# Patient Record
Sex: FEMALE | URBAN - METROPOLITAN AREA
[De-identification: names, ages, dates, MRNs, and addresses within clinical notes are randomized per-mention and may not be internally consistent; named-entity substitution may affect disease eponyms.]

---

## 2017-03-01 ENCOUNTER — OUTPATIENT (OUTPATIENT)
Dept: OUTPATIENT SERVICES | Facility: HOSPITAL | Age: 67
LOS: 1 days | End: 2017-03-01
Payer: MEDICAID

## 2017-03-14 DIAGNOSIS — R69 ILLNESS, UNSPECIFIED: ICD-10-CM

## 2017-09-01 PROCEDURE — G9001: CPT

## 2025-03-05 ENCOUNTER — INPATIENT (INPATIENT)
Facility: HOSPITAL | Age: 75
LOS: 1 days | Discharge: HOME | DRG: 282 | End: 2025-03-07
Attending: STUDENT IN AN ORGANIZED HEALTH CARE EDUCATION/TRAINING PROGRAM | Admitting: STUDENT IN AN ORGANIZED HEALTH CARE EDUCATION/TRAINING PROGRAM
Payer: COMMERCIAL

## 2025-03-05 VITALS
SYSTOLIC BLOOD PRESSURE: 155 MMHG | WEIGHT: 139.99 LBS | HEART RATE: 83 BPM | OXYGEN SATURATION: 97 % | DIASTOLIC BLOOD PRESSURE: 82 MMHG | HEIGHT: 55 IN | TEMPERATURE: 98 F | RESPIRATION RATE: 18 BRPM

## 2025-03-05 LAB
ALBUMIN SERPL ELPH-MCNC: 3.8 G/DL — SIGNIFICANT CHANGE UP (ref 3.3–5)
ALP SERPL-CCNC: 85 U/L — SIGNIFICANT CHANGE UP (ref 40–120)
ALT FLD-CCNC: 16 U/L — SIGNIFICANT CHANGE UP (ref 10–45)
ANION GAP SERPL CALC-SCNC: 13 MMOL/L — SIGNIFICANT CHANGE UP (ref 5–17)
APTT BLD: 27.4 SEC — SIGNIFICANT CHANGE UP (ref 24.5–35.6)
AST SERPL-CCNC: 18 U/L — SIGNIFICANT CHANGE UP (ref 10–40)
BASOPHILS # BLD AUTO: 0.02 K/UL — SIGNIFICANT CHANGE UP (ref 0–0.2)
BASOPHILS NFR BLD AUTO: 0.4 % — SIGNIFICANT CHANGE UP (ref 0–2)
BILIRUB SERPL-MCNC: 0.4 MG/DL — SIGNIFICANT CHANGE UP (ref 0.2–1.2)
BLD GP AB SCN SERPL QL: NEGATIVE — SIGNIFICANT CHANGE UP
BUN SERPL-MCNC: 9 MG/DL — SIGNIFICANT CHANGE UP (ref 7–23)
CALCIUM SERPL-MCNC: 8.9 MG/DL — SIGNIFICANT CHANGE UP (ref 8.4–10.5)
CHLORIDE SERPL-SCNC: 104 MMOL/L — SIGNIFICANT CHANGE UP (ref 96–108)
CK MB CFR SERPL CALC: 4.3 NG/ML — HIGH (ref 0–3.8)
CO2 SERPL-SCNC: 23 MMOL/L — SIGNIFICANT CHANGE UP (ref 22–31)
CREAT SERPL-MCNC: 0.61 MG/DL — SIGNIFICANT CHANGE UP (ref 0.5–1.3)
EGFR: 94 ML/MIN/1.73M2 — SIGNIFICANT CHANGE UP
EGFR: 94 ML/MIN/1.73M2 — SIGNIFICANT CHANGE UP
EOSINOPHIL # BLD AUTO: 0.06 K/UL — SIGNIFICANT CHANGE UP (ref 0–0.5)
EOSINOPHIL NFR BLD AUTO: 1.1 % — SIGNIFICANT CHANGE UP (ref 0–6)
GLUCOSE SERPL-MCNC: 110 MG/DL — HIGH (ref 70–99)
HCT VFR BLD CALC: 35.9 % — SIGNIFICANT CHANGE UP (ref 34.5–45)
HGB BLD-MCNC: 12.6 G/DL — SIGNIFICANT CHANGE UP (ref 11.5–15.5)
IMM GRANULOCYTES NFR BLD AUTO: 0.2 % — SIGNIFICANT CHANGE UP (ref 0–0.9)
INR BLD: 1.01 RATIO — SIGNIFICANT CHANGE UP (ref 0.85–1.16)
LYMPHOCYTES # BLD AUTO: 2.02 K/UL — SIGNIFICANT CHANGE UP (ref 1–3.3)
LYMPHOCYTES # BLD AUTO: 36.5 % — SIGNIFICANT CHANGE UP (ref 13–44)
MCHC RBC-ENTMCNC: 31 PG — SIGNIFICANT CHANGE UP (ref 27–34)
MCHC RBC-ENTMCNC: 35.1 G/DL — SIGNIFICANT CHANGE UP (ref 32–36)
MCV RBC AUTO: 88.2 FL — SIGNIFICANT CHANGE UP (ref 80–100)
MONOCYTES # BLD AUTO: 0.4 K/UL — SIGNIFICANT CHANGE UP (ref 0–0.9)
MONOCYTES NFR BLD AUTO: 7.2 % — SIGNIFICANT CHANGE UP (ref 2–14)
NEUTROPHILS # BLD AUTO: 3.03 K/UL — SIGNIFICANT CHANGE UP (ref 1.8–7.4)
NEUTROPHILS NFR BLD AUTO: 54.6 % — SIGNIFICANT CHANGE UP (ref 43–77)
NRBC BLD AUTO-RTO: 0 /100 WBCS — SIGNIFICANT CHANGE UP (ref 0–0)
PLATELET # BLD AUTO: 203 K/UL — SIGNIFICANT CHANGE UP (ref 150–400)
POTASSIUM SERPL-MCNC: 3.4 MMOL/L — LOW (ref 3.5–5.3)
POTASSIUM SERPL-SCNC: 3.4 MMOL/L — LOW (ref 3.5–5.3)
PROT SERPL-MCNC: 6.7 G/DL — SIGNIFICANT CHANGE UP (ref 6–8.3)
PROTHROM AB SERPL-ACNC: 11.6 SEC — SIGNIFICANT CHANGE UP (ref 9.9–13.4)
RBC # BLD: 4.07 M/UL — SIGNIFICANT CHANGE UP (ref 3.8–5.2)
RBC # FLD: 14.6 % — HIGH (ref 10.3–14.5)
RH IG SCN BLD-IMP: POSITIVE — SIGNIFICANT CHANGE UP
SODIUM SERPL-SCNC: 140 MMOL/L — SIGNIFICANT CHANGE UP (ref 135–145)
TROPONIN T, HIGH SENSITIVITY RESULT: 294 NG/L — HIGH (ref 0–51)
WBC # BLD: 5.54 K/UL — SIGNIFICANT CHANGE UP (ref 3.8–10.5)
WBC # FLD AUTO: 5.54 K/UL — SIGNIFICANT CHANGE UP (ref 3.8–10.5)

## 2025-03-05 PROCEDURE — 99291 CRITICAL CARE FIRST HOUR: CPT

## 2025-03-05 PROCEDURE — 71045 X-RAY EXAM CHEST 1 VIEW: CPT | Mod: 26

## 2025-03-05 RX ORDER — SODIUM CHLORIDE 9 G/1000ML
1000 INJECTION, SOLUTION INTRAVENOUS
Refills: 0 | Status: DISCONTINUED | OUTPATIENT
Start: 2025-03-05 | End: 2025-03-06

## 2025-03-05 RX ORDER — DEXTROSE 50 % IN WATER 50 %
15 SYRINGE (ML) INTRAVENOUS ONCE
Refills: 0 | Status: DISCONTINUED | OUTPATIENT
Start: 2025-03-05 | End: 2025-03-06

## 2025-03-05 RX ORDER — HEPARIN SODIUM 1000 [USP'U]/ML
3800 INJECTION INTRAVENOUS; SUBCUTANEOUS ONCE
Refills: 0 | Status: COMPLETED | OUTPATIENT
Start: 2025-03-05 | End: 2025-03-05

## 2025-03-05 RX ORDER — HEPARIN SODIUM 1000 [USP'U]/ML
3800 INJECTION INTRAVENOUS; SUBCUTANEOUS EVERY 6 HOURS
Refills: 0 | Status: DISCONTINUED | OUTPATIENT
Start: 2025-03-05 | End: 2025-03-06

## 2025-03-05 RX ORDER — DEXTROSE 50 % IN WATER 50 %
12.5 SYRINGE (ML) INTRAVENOUS ONCE
Refills: 0 | Status: DISCONTINUED | OUTPATIENT
Start: 2025-03-05 | End: 2025-03-06

## 2025-03-05 RX ORDER — DEXTROSE 50 % IN WATER 50 %
25 SYRINGE (ML) INTRAVENOUS ONCE
Refills: 0 | Status: DISCONTINUED | OUTPATIENT
Start: 2025-03-05 | End: 2025-03-06

## 2025-03-05 RX ORDER — HEPARIN SODIUM 1000 [USP'U]/ML
INJECTION INTRAVENOUS; SUBCUTANEOUS
Qty: 25000 | Refills: 0 | Status: DISCONTINUED | OUTPATIENT
Start: 2025-03-05 | End: 2025-03-06

## 2025-03-05 RX ORDER — INSULIN LISPRO 100 U/ML
INJECTION, SOLUTION INTRAVENOUS; SUBCUTANEOUS AT BEDTIME
Refills: 0 | Status: DISCONTINUED | OUTPATIENT
Start: 2025-03-05 | End: 2025-03-06

## 2025-03-05 RX ORDER — GLUCAGON 3 MG/1
1 POWDER NASAL ONCE
Refills: 0 | Status: DISCONTINUED | OUTPATIENT
Start: 2025-03-05 | End: 2025-03-06

## 2025-03-05 RX ORDER — INSULIN LISPRO 100 U/ML
INJECTION, SOLUTION INTRAVENOUS; SUBCUTANEOUS
Refills: 0 | Status: DISCONTINUED | OUTPATIENT
Start: 2025-03-05 | End: 2025-03-06

## 2025-03-05 RX ADMIN — HEPARIN SODIUM 750 UNIT(S)/HR: 1000 INJECTION INTRAVENOUS; SUBCUTANEOUS at 23:57

## 2025-03-05 RX ADMIN — HEPARIN SODIUM 3800 UNIT(S): 1000 INJECTION INTRAVENOUS; SUBCUTANEOUS at 23:55

## 2025-03-05 NOTE — ED ADULT NURSE NOTE - OBJECTIVE STATEMENT
74 y/o F with PMHx of HTN, HLD, DM, HIV presents to the ED brought in by EMS as a transfer from [facility] for NSTEMI/chest pain. Per patient developed chest pain 13 days ago, however has since worsened over the last day. Pain described as epigastric/burning pain. Per EMS, patient initial troponin noted to be 254 followed by 271. Patient given aspirin 324 and 40 meq of K+ at transfer facility. Patient notes pain is improved, notes pain 7/10 in severity at present. NSR on cardiac monitor. AOx2 and speaking coherently, disoriented to time/situation. Breathing is unlabored, spontaneous, and symmetrical. Abdomen is soft, nondistended, and nontender. No bladder distention. No peripheral edema noted. <2s capillary refill. Ambulatory with full ROM of all extremities.

## 2025-03-05 NOTE — ED PROVIDER NOTE - ATTENDING CONTRIBUTION TO CARE
Attending MD Griffin:  I have seen and examined this patient and fully participated in the care of this patient as the teaching attending. I personally made/approved the management plan and take responsibility for the patient management.      75-year-old woman with history of HIV is transferred from outside hospital for reported NSTEMI.  Patient reports that she developed chest pain during an argument with her outpatient doctor yesterday or last night.  At this time she is not experiencing chest pain but feels "moving" in her chest.  Reported troponin was 254 and then up trended to 271.  Patient received aspirin 324 and potassium but no other interventions.  Patient states that she has a history of maybe some cardiac issues but she does not know the specifics of this and states that she was taking aspirin previously.    Patient's vital signs are notable for blood pressure 155 systolic otherwise nonactionable.  Patient sitting up in the stretcher in no apparent distress.  She is breathing comfortably on room air.  Extremities warm and well-perfused.    ECG recorded at 9:21 PM independently interpreted by me , Dr Mauricio Griffin,  at 9:45 PM shows normal sinus rhythm normal axis normal intervals T wave inversions lead III lead aVF no ST elevation or ST depression.    Patient presenting for reported chest pain as outpatient with elevated cardiac enzymes, patient seen by cardiology service on arrival to ED, they recommend repeating cardiac enzymes and further recommendations will be as per repeat lab work      *The above represents an initial assessment/impression. Please refer to progress notes for potential changes in patient clinical course*

## 2025-03-05 NOTE — ED PROVIDER NOTE - CLINICAL SUMMARY MEDICAL DECISION MAKING FREE TEXT BOX
Pt is a 76 y/o F with PMHx of HIV, HTN, Asthma, T2DM (on insulin), HLD presenting to the ER as an NSTEMI evaluation from [facility]. Pt has been having epigastric pain x13D, went to see her  @1PM for HIV f/u at which time she was arguing with the physician and began experiencing exertional chest pain for which she was originally sent to [facility]. There, Troponin uptrending 254 -> 271 (most recently @6PM) concerning for NSTEMI and was given 324 ASA @6PM and potassium repletion. ASA resolved her pain, currently reports "movement in chest" though no pain, nausea, vomiting. Of note patient stated 8 years ago was started on aspirin for "heart problems", no cardiac stent history. Cardiology consulted here.    VS grossly normal aside from HTN, PE unremarkable  Labs and Imaging pending, repeat troponin pending  EKG with T-wave inversions in inferior leads III, AVF though no ST changes    - Repeat labs, troponin, CXR  - Cardiology onboard

## 2025-03-06 DIAGNOSIS — I21.4 NON-ST ELEVATION (NSTEMI) MYOCARDIAL INFARCTION: ICD-10-CM

## 2025-03-06 DIAGNOSIS — E11.9 TYPE 2 DIABETES MELLITUS WITHOUT COMPLICATIONS: ICD-10-CM

## 2025-03-06 DIAGNOSIS — B20 HUMAN IMMUNODEFICIENCY VIRUS [HIV] DISEASE: ICD-10-CM

## 2025-03-06 DIAGNOSIS — E04.9 NONTOXIC GOITER, UNSPECIFIED: ICD-10-CM

## 2025-03-06 DIAGNOSIS — I10 ESSENTIAL (PRIMARY) HYPERTENSION: ICD-10-CM

## 2025-03-06 DIAGNOSIS — E78.5 HYPERLIPIDEMIA, UNSPECIFIED: ICD-10-CM

## 2025-03-06 LAB
A1C WITH ESTIMATED AVERAGE GLUCOSE RESULT: 9.5 % — HIGH (ref 4–5.6)
APTT BLD: 60 SEC — HIGH (ref 24.5–35.6)
CHOLEST SERPL-MCNC: 189 MG/DL — SIGNIFICANT CHANGE UP
CK MB BLD-MCNC: 3.8 % — HIGH (ref 0–3.5)
CK MB CFR SERPL CALC: 3.7 NG/ML — SIGNIFICANT CHANGE UP (ref 0–3.8)
CK SERPL-CCNC: 97 U/L — SIGNIFICANT CHANGE UP (ref 25–170)
ESTIMATED AVERAGE GLUCOSE: 226 MG/DL — HIGH (ref 68–114)
GLUCOSE BLDC GLUCOMTR-MCNC: 122 MG/DL — HIGH (ref 70–99)
GLUCOSE BLDC GLUCOMTR-MCNC: 172 MG/DL — HIGH (ref 70–99)
GLUCOSE BLDC GLUCOMTR-MCNC: 173 MG/DL — HIGH (ref 70–99)
HCT VFR BLD CALC: 35.6 % — SIGNIFICANT CHANGE UP (ref 34.5–45)
HDLC SERPL-MCNC: 48 MG/DL — LOW
HGB BLD-MCNC: 12.4 G/DL — SIGNIFICANT CHANGE UP (ref 11.5–15.5)
LIPID PNL WITH DIRECT LDL SERPL: 108 MG/DL — HIGH
MCHC RBC-ENTMCNC: 31.3 PG — SIGNIFICANT CHANGE UP (ref 27–34)
MCHC RBC-ENTMCNC: 34.8 G/DL — SIGNIFICANT CHANGE UP (ref 32–36)
MCV RBC AUTO: 89.9 FL — SIGNIFICANT CHANGE UP (ref 80–100)
NON HDL CHOLESTEROL: 141 MG/DL — HIGH
NRBC BLD AUTO-RTO: 0 /100 WBCS — SIGNIFICANT CHANGE UP (ref 0–0)
PLATELET # BLD AUTO: 204 K/UL — SIGNIFICANT CHANGE UP (ref 150–400)
RBC # BLD: 3.96 M/UL — SIGNIFICANT CHANGE UP (ref 3.8–5.2)
RBC # FLD: 14.8 % — HIGH (ref 10.3–14.5)
TRIGL SERPL-MCNC: 190 MG/DL — HIGH
TROPONIN T, HIGH SENSITIVITY RESULT: 321 NG/L — HIGH (ref 0–51)
WBC # BLD: 4.99 K/UL — SIGNIFICANT CHANGE UP (ref 3.8–10.5)
WBC # FLD AUTO: 4.99 K/UL — SIGNIFICANT CHANGE UP (ref 3.8–10.5)

## 2025-03-06 PROCEDURE — 92978 ENDOLUMINL IVUS OCT C 1ST: CPT | Mod: 26,RC

## 2025-03-06 PROCEDURE — 99223 1ST HOSP IP/OBS HIGH 75: CPT

## 2025-03-06 PROCEDURE — 93458 L HRT ARTERY/VENTRICLE ANGIO: CPT | Mod: 26,59

## 2025-03-06 PROCEDURE — 93010 ELECTROCARDIOGRAM REPORT: CPT

## 2025-03-06 PROCEDURE — 92928 PRQ TCAT PLMT NTRAC ST 1 LES: CPT | Mod: RC

## 2025-03-06 PROCEDURE — 99152 MOD SED SAME PHYS/QHP 5/>YRS: CPT

## 2025-03-06 RX ORDER — CLOPIDOGREL BISULFATE 75 MG/1
75 TABLET, FILM COATED ORAL DAILY
Refills: 0 | Status: DISCONTINUED | OUTPATIENT
Start: 2025-03-07 | End: 2025-03-07

## 2025-03-06 RX ORDER — MELATONIN 5 MG
3 TABLET ORAL AT BEDTIME
Refills: 0 | Status: DISCONTINUED | OUTPATIENT
Start: 2025-03-06 | End: 2025-03-07

## 2025-03-06 RX ORDER — OMEPRAZOLE 20 MG/1
1 CAPSULE, DELAYED RELEASE ORAL
Refills: 0 | DISCHARGE

## 2025-03-06 RX ORDER — SODIUM CHLORIDE 9 G/1000ML
1000 INJECTION, SOLUTION INTRAVENOUS
Refills: 0 | Status: DISCONTINUED | OUTPATIENT
Start: 2025-03-06 | End: 2025-03-07

## 2025-03-06 RX ORDER — GLUCAGON 3 MG/1
1 POWDER NASAL ONCE
Refills: 0 | Status: DISCONTINUED | OUTPATIENT
Start: 2025-03-06 | End: 2025-03-07

## 2025-03-06 RX ORDER — DEXTROSE 50 % IN WATER 50 %
25 SYRINGE (ML) INTRAVENOUS ONCE
Refills: 0 | Status: DISCONTINUED | OUTPATIENT
Start: 2025-03-06 | End: 2025-03-07

## 2025-03-06 RX ORDER — ASPIRIN 325 MG
81 TABLET ORAL DAILY
Refills: 0 | Status: DISCONTINUED | OUTPATIENT
Start: 2025-03-06 | End: 2025-03-07

## 2025-03-06 RX ORDER — DEXTROSE 50 % IN WATER 50 %
15 SYRINGE (ML) INTRAVENOUS ONCE
Refills: 0 | Status: DISCONTINUED | OUTPATIENT
Start: 2025-03-06 | End: 2025-03-07

## 2025-03-06 RX ORDER — HYDROCHLOROTHIAZIDE 50 MG/1
1 TABLET ORAL
Refills: 0 | DISCHARGE

## 2025-03-06 RX ORDER — METOPROLOL SUCCINATE 50 MG/1
1 TABLET, EXTENDED RELEASE ORAL
Refills: 0 | DISCHARGE

## 2025-03-06 RX ORDER — ATORVASTATIN CALCIUM 80 MG/1
40 TABLET, FILM COATED ORAL AT BEDTIME
Refills: 0 | Status: DISCONTINUED | OUTPATIENT
Start: 2025-03-06 | End: 2025-03-07

## 2025-03-06 RX ORDER — METOPROLOL SUCCINATE 50 MG/1
50 TABLET, EXTENDED RELEASE ORAL
Refills: 0 | Status: DISCONTINUED | OUTPATIENT
Start: 2025-03-06 | End: 2025-03-07

## 2025-03-06 RX ORDER — AMLODIPINE BESYLATE 10 MG/1
1 TABLET ORAL
Refills: 0 | DISCHARGE

## 2025-03-06 RX ORDER — LATANOPROST PF 0.05 MG/ML
1 SOLUTION/ DROPS OPHTHALMIC
Refills: 0 | DISCHARGE

## 2025-03-06 RX ORDER — INSULIN LISPRO 100 U/ML
INJECTION, SOLUTION INTRAVENOUS; SUBCUTANEOUS AT BEDTIME
Refills: 0 | Status: DISCONTINUED | OUTPATIENT
Start: 2025-03-06 | End: 2025-03-07

## 2025-03-06 RX ORDER — ACETAMINOPHEN 500 MG/5ML
650 LIQUID (ML) ORAL EVERY 6 HOURS
Refills: 0 | Status: DISCONTINUED | OUTPATIENT
Start: 2025-03-06 | End: 2025-03-07

## 2025-03-06 RX ORDER — INSULIN GLARGINE-YFGN 100 [IU]/ML
15 INJECTION, SOLUTION SUBCUTANEOUS AT BEDTIME
Refills: 0 | Status: DISCONTINUED | OUTPATIENT
Start: 2025-03-06 | End: 2025-03-07

## 2025-03-06 RX ORDER — BICTEGRAVIR SODIUM, EMTRICITABINE, AND TENOFOVIR ALAFENAMIDE FUMARATE 30; 120; 15 MG/1; MG/1; MG/1
1 TABLET ORAL DAILY
Refills: 0 | Status: DISCONTINUED | OUTPATIENT
Start: 2025-03-06 | End: 2025-03-07

## 2025-03-06 RX ORDER — METFORMIN HYDROCHLORIDE 850 MG/1
1 TABLET ORAL
Refills: 0 | DISCHARGE

## 2025-03-06 RX ORDER — INSULIN LISPRO 100 U/ML
INJECTION, SOLUTION INTRAVENOUS; SUBCUTANEOUS
Refills: 0 | Status: DISCONTINUED | OUTPATIENT
Start: 2025-03-06 | End: 2025-03-07

## 2025-03-06 RX ORDER — MAGNESIUM, ALUMINUM HYDROXIDE 200-200 MG
30 TABLET,CHEWABLE ORAL EVERY 4 HOURS
Refills: 0 | Status: DISCONTINUED | OUTPATIENT
Start: 2025-03-06 | End: 2025-03-07

## 2025-03-06 RX ORDER — LATANOPROST PF 0.05 MG/ML
1 SOLUTION/ DROPS OPHTHALMIC AT BEDTIME
Refills: 0 | Status: DISCONTINUED | OUTPATIENT
Start: 2025-03-06 | End: 2025-03-07

## 2025-03-06 RX ORDER — BICTEGRAVIR SODIUM, EMTRICITABINE, AND TENOFOVIR ALAFENAMIDE FUMARATE 30; 120; 15 MG/1; MG/1; MG/1
1 TABLET ORAL
Refills: 0 | DISCHARGE

## 2025-03-06 RX ORDER — ATORVASTATIN CALCIUM 80 MG/1
10 TABLET, FILM COATED ORAL AT BEDTIME
Refills: 0 | Status: DISCONTINUED | OUTPATIENT
Start: 2025-03-06 | End: 2025-03-06

## 2025-03-06 RX ORDER — AMLODIPINE BESYLATE 10 MG/1
10 TABLET ORAL DAILY
Refills: 0 | Status: DISCONTINUED | OUTPATIENT
Start: 2025-03-06 | End: 2025-03-07

## 2025-03-06 RX ORDER — DEXTROSE 50 % IN WATER 50 %
12.5 SYRINGE (ML) INTRAVENOUS ONCE
Refills: 0 | Status: DISCONTINUED | OUTPATIENT
Start: 2025-03-06 | End: 2025-03-07

## 2025-03-06 RX ADMIN — METOPROLOL SUCCINATE 50 MILLIGRAM(S): 50 TABLET, EXTENDED RELEASE ORAL at 22:42

## 2025-03-06 RX ADMIN — LATANOPROST PF 1 DROP(S): 0.05 SOLUTION/ DROPS OPHTHALMIC at 22:41

## 2025-03-06 RX ADMIN — Medication 100 MILLILITER(S): at 17:01

## 2025-03-06 RX ADMIN — BICTEGRAVIR SODIUM, EMTRICITABINE, AND TENOFOVIR ALAFENAMIDE FUMARATE 1 TABLET(S): 30; 120; 15 TABLET ORAL at 22:41

## 2025-03-06 RX ADMIN — ATORVASTATIN CALCIUM 40 MILLIGRAM(S): 80 TABLET, FILM COATED ORAL at 22:42

## 2025-03-06 RX ADMIN — INSULIN LISPRO 1: 100 INJECTION, SOLUTION INTRAVENOUS; SUBCUTANEOUS at 09:23

## 2025-03-06 RX ADMIN — HEPARIN SODIUM 750 UNIT(S)/HR: 1000 INJECTION INTRAVENOUS; SUBCUTANEOUS at 08:30

## 2025-03-06 RX ADMIN — INSULIN GLARGINE-YFGN 15 UNIT(S): 100 INJECTION, SOLUTION SUBCUTANEOUS at 22:41

## 2025-03-06 NOTE — DISCHARGE NOTE PROVIDER - NSDCCPCAREPLAN_GEN_ALL_CORE_FT
PRINCIPAL DISCHARGE DIAGNOSIS  Diagnosis: NSTEMI (non-ST elevation myocardial infarction)  Assessment and Plan of Treatment: Call your doctor if you have unusual chest pain, pressure, or discomfort, shortness of breath, nausea, vomiting, burping, heartburn, tingling upper body parts, sweating, cold, clammy sking, racing heartbeat  Call 911 if you think you are having a heart attack  Take all cardiac medications as prescribed - notify your doctor if you have any side effects  Follow cardiac diet - avoid fatty & fried foods, don't eat too much red meat, eat lots of fruits & vegetables, dairy products should be low fat  Lose weight if you are overweight  Become more active with walking, gardening, or any other activity that gets you to move        SECONDARY DISCHARGE DIAGNOSES  Diagnosis: DM (diabetes mellitus)  Assessment and Plan of Treatment: HgA1C this admission.  Make sure you get your HgA1c checked every three months.  If you take oral diabetes medications, check your blood glucose two times a day.  If you take insulin, check your blood glucose before meals and at bedtime.  It's important not to skip any meals.  Keep a log of your blood glucose results and always take it with you to your doctor appointments.  Keep a list of your current medications including injectables and over the counter medications and bring this medication list with you to all your doctor appointments.  If you have not seen your ophthalmologist this year call for appointment.  Check your feet daily for redness, sores, or openings. Do not self treat. If no improvement in two days call your primary care physician for an appointment.  Low blood sugar (hypoglycemia) is a blood sugar below 70mg/dl. Check your blood sugar if you feel signs/symptoms of hypoglycemia. If your blood sugar is below 70 take 15 grams of carbohydrates (ex 4 oz of apple juice, 3-4 glucose tablets, or 4-6 oz of regular soda) wait 15 minutes and repeat blood sugar to make sure it comes up above 70.  If your blood sugar is above 70 and you are due for a meal, have a meal.  If you are not due for a meal have a snack.  This snack helps keeps your blood sugar at a safe range.      Diagnosis: HTN (hypertension)  Assessment and Plan of Treatment: Follow up with your medical doctor to establish long term blood pressure treatment goals.

## 2025-03-06 NOTE — H&P ADULT - PROBLEM SELECTOR PLAN 4
Pt on multiple oral/SQ meds and insulin at home (repaglinide, actos, metformin, januvia, jardiance, ozempic, basaglar)  Will check her A1c - ? unclear she needs so many meds.   Will monitor on SS and cont with long acting insulin - will dec to 15 units from 20 units for now.   Hold rest of DM meds

## 2025-03-06 NOTE — CONSULT NOTE ADULT - ATTENDING COMMENTS
CARDIOLOGY ATTENDING CONSULT NOTE     HPI    Please refer to summary under A/P section    ROS  Relevant ROS above in HPI    PMH/PSH  As above    FH/SH  Denies smoking, illicits or alcohol  Reports she does not know anything about her family as she does not speak with them    PE  Vitals  Gen: NAD, breathing comfortably  HEENT: MMM, anicteric sclera  Neck: JVP < 10 cm  Cardiac: RRR, 1/6 systolic murmur  Lungs: Adequate inspiratory effort. CTAB  Abd: soft, NT/ND. +BS  Ext: warm and well perfused. No significant LE edema    Data:  I have personally reviewed all data:  Labs reviewed     CXR  FINDINGS:  The heart is normal in size.  Nonspecific superior mediastinal opacity.  There is no pneumothorax or pleural effusion.  No acute bony abnormality.  IMPRESSION:  Nonspecific superior mediastinal opacity may represent enlarged thyroid.    EKG:  sinus rhythm. T wave inversions consider inferior ischemia    Echo:    Telemetry  sinus rhythm, intermittent sinus tachycardia    MEDICATIONS  (STANDING):  amLODIPine   Tablet 10 milliGRAM(s) Oral daily  aspirin  chewable 81 milliGRAM(s) Oral daily  atorvastatin 10 milliGRAM(s) Oral at bedtime  bictegravir 50 mG/emtricitabine 200 mG/tenofovir alafenamide 25 mG (BIKTARVY) 1 Tablet(s) Oral daily  dextrose 5%. 1000 milliLiter(s) (100 mL/Hr) IV Continuous <Continuous>  dextrose 5%. 1000 milliLiter(s) (50 mL/Hr) IV Continuous <Continuous>  dextrose 50% Injectable 25 Gram(s) IV Push once  dextrose 50% Injectable 12.5 Gram(s) IV Push once  dextrose 50% Injectable 25 Gram(s) IV Push once  glucagon  Injectable 1 milliGRAM(s) IntraMuscular once  heparin  Infusion.  Unit(s)/Hr (7.5 mL/Hr) IV Continuous <Continuous>  insulin glargine Injectable (LANTUS) 15 Unit(s) SubCutaneous at bedtime  insulin lispro (ADMELOG) corrective regimen sliding scale   SubCutaneous three times a day before meals  insulin lispro (ADMELOG) corrective regimen sliding scale   SubCutaneous at bedtime  latanoprost 0.005% Ophthalmic Solution 1 Drop(s) Both EYES at bedtime  metoprolol tartrate 50 milliGRAM(s) Oral two times a day  pantoprazole    Tablet 40 milliGRAM(s) Oral before breakfast        A/P    DOMENIC AGARWAL is a 75y Female with a history of HIV on HAART, DM, HTN who presented to an outside hospital with chest pain and was found to have an elevated troponin. Due to concern for NSTEMI she was transferred to [facility] for further management.     #NSTE-ACS/NSTEMI  #HTN  #DM    Left heart catheterization today. Continue ASA. P2Y12 inhibitor can be loaded at time of angiogram if PCI needed. Continue heparin gtt and metoprolol.     Please check TSH (especially given CXR findings), A1c, Lipids    We will continue to follow with you. Thank you for allowing us to participate in this patient's care.    Kg Almanzar MD, [facility]  For [facility] Cardiology and Cardiovascular Surgery 24/7 service contact information, please go to [email or web address] ("[email or web address]" to login) CARDIOLOGY ATTENDING CONSULT NOTE     HPI    Please refer to summary under A/P section    ROS  Relevant ROS above in HPI    PMH/PSH  As above    FH/SH  Denies smoking, illicits or alcohol  Reports she does not know anything about her family as she does not speak with them    PE  Vitals  Gen: NAD, breathing comfortably  HEENT: MMM, anicteric sclera  Neck: JVP < 10 cm  Cardiac: RRR, 1/6 systolic murmur  Lungs: Adequate inspiratory effort. CTAB  Abd: soft, NT/ND. +BS  Ext: warm and well perfused. No significant LE edema    Data:  I have personally reviewed all data:  Labs reviewed     CXR  FINDINGS:  The heart is normal in size.  Nonspecific superior mediastinal opacity.  There is no pneumothorax or pleural effusion.  No acute bony abnormality.  IMPRESSION:  Nonspecific superior mediastinal opacity may represent enlarged thyroid.    EKG:  sinus rhythm. T wave inversions consider inferior ischemia    Echo:    Telemetry  sinus rhythm, intermittent sinus tachycardia    MEDICATIONS  (STANDING):  amLODIPine   Tablet 10 milliGRAM(s) Oral daily  aspirin  chewable 81 milliGRAM(s) Oral daily  atorvastatin 10 milliGRAM(s) Oral at bedtime  bictegravir 50 mG/emtricitabine 200 mG/tenofovir alafenamide 25 mG (BIKTARVY) 1 Tablet(s) Oral daily  dextrose 5%. 1000 milliLiter(s) (100 mL/Hr) IV Continuous <Continuous>  dextrose 5%. 1000 milliLiter(s) (50 mL/Hr) IV Continuous <Continuous>  dextrose 50% Injectable 25 Gram(s) IV Push once  dextrose 50% Injectable 12.5 Gram(s) IV Push once  dextrose 50% Injectable 25 Gram(s) IV Push once  glucagon  Injectable 1 milliGRAM(s) IntraMuscular once  heparin  Infusion.  Unit(s)/Hr (7.5 mL/Hr) IV Continuous <Continuous>  insulin glargine Injectable (LANTUS) 15 Unit(s) SubCutaneous at bedtime  insulin lispro (ADMELOG) corrective regimen sliding scale   SubCutaneous three times a day before meals  insulin lispro (ADMELOG) corrective regimen sliding scale   SubCutaneous at bedtime  latanoprost 0.005% Ophthalmic Solution 1 Drop(s) Both EYES at bedtime  metoprolol tartrate 50 milliGRAM(s) Oral two times a day  pantoprazole    Tablet 40 milliGRAM(s) Oral before breakfast        A/P    DOMENIC AGARWAL is a 75y Female with a history of HIV on HAART, DM, HTN who presented to an outside hospital with chest pain and was found to have an elevated troponin. Due to concern for NSTEMI she was transferred to [facility] for further management.     #NSTE-ACS/NSTEMI  #HTN  #DM    Left heart catheterization today. Continue ASA. P2Y12 inhibitor can be loaded at time of angiogram if PCI needed. Continue heparin gtt and metoprolol. Increase atorvastatin to high intensity dosing, 40-80 mg (no interaction with Biktarvy) if no history of statin intolerance    Please check TSH (especially given CXR findings), A1c, Lipids    We will continue to follow with you. Thank you for allowing us to participate in this patient's care.    Kg Almanzar MD, [facility]  For [facility] Cardiology and Cardiovascular Surgery 24/7 service contact information, please go to [email or web address] ("[email or web address]" to login)

## 2025-03-06 NOTE — DISCHARGE NOTE PROVIDER - HOSPITAL COURSE
HPI:  74 yo f with h/o HTN, DM2, HLD, HIV, asthma presented after an argument with her outpt HIV provider developed chest pain . Patient was seen at OSH and transferred to [facility] for further card eval. Pt reports she has been having substernal chest pain - pressure like, nonradiating for about 2 weeks. slight worse with exertion when going up stairs. some MURDOCK. denies any positional or orthopnea or leg swelling. Pain suddenly got worse yesterday while at outpt provider office during conversation. unable to tell me how long symptoms lasted. this am still has some chest discomfort but contributes to "gas." Per pt no previous known h/o heart dz or card w/u such as echo, stress, angio     Overnight most recent VS afebrile, 123/70, 92, 18, 97% RA . at time seen visible discomfort due to reflux like symptoms intermittently. denies any nausea or vomiting.  (06 Mar 2025 09:43)    Hospital Course:      Important Medication Changes and Reason:    Active or Pending Issues Requiring Follow-up:    Advanced Directives:   [ X] Full code  [ ] DNR  [ ] Hospice    Discharge Diagnoses:  NSTEMI       HPI:  76 yo f with h/o HTN, DM2, HLD, HIV, asthma presented after an argument with her outpt HIV provider developed chest pain . Patient was seen at OSH and transferred to [facility] for further card eval. Pt reports she has been having substernal chest pain - pressure like, nonradiating for about 2 weeks. slight worse with exertion when going up stairs. some MURDOCK. denies any positional or orthopnea or leg swelling. Pain suddenly got worse yesterday while at outpt provider office during conversation. unable to tell me how long symptoms lasted. this am still has some chest discomfort but contributes to "gas." Per pt no previous known h/o heart dz or card w/u such as echo, stress, angio     Overnight most recent VS afebrile, 123/70, 92, 18, 97% RA . at time seen visible discomfort due to reflux like symptoms intermittently. denies any nausea or vomiting.  (06 Mar 2025 09:43)    Hospital Course:    Pt s/p PCI/KY x 1 and POBA x 2 to mRCA 90% via RRA with 3/10 chest pain right sided initially with no ecg changes developed dizziness now resolved post IVF and chest pain free.  Discussed post cath care and cardiac rehab and medication review and need to take her medication without interruption.  Pt verbalized understanding via [facility]  Ja ID#[id number] Post care sheet and Cardiac rehab referral on chart in d/c folder.     Important Medication Changes and Reason:  add ASA  add PLV    hold Actos  hold Januvia    c/w Ozempic  c/w Lantus  c/w Metformin    Active or Pending Issues Requiring Follow-up:  PCP  Cardiology  Endocrinology     Advanced Directives:   [ X] Full code  [ ] DNR  [ ] Hospice    Discharge Diagnoses:  NSTEMI

## 2025-03-06 NOTE — H&P ADULT - PROBLEM SELECTOR PLAN 1
Overall clinical history and risk factors somewhat concerning for underlying CAD.  Elevated hsTrop from 294 to 321 this am CK, MB WNL no ECG changes   Still with some epigastric discomfort though more related to reflux symptoms.   Card eval noted - currently on hep gtt and asa    Plan for likely LHC today to jono - will follow up

## 2025-03-06 NOTE — H&P ADULT - NSICDXPASTMEDICALHX_GEN_ALL_CORE_FT
PAST MEDICAL HISTORY:  Asthma     HIV disease     HLD (hyperlipidemia)     Hypertension     Type 2 diabetes mellitus

## 2025-03-06 NOTE — DISCHARGE NOTE PROVIDER - NSDCMRMEDTOKEN_GEN_ALL_CORE_FT
Actos 30 mg oral tablet: 1 tab(s) orally once a day  aspirin 81 mg oral tablet, chewable: 1 tab(s) chewed once a day  atorvastatin 10 mg oral tablet: 1 tab(s) orally once a day  Basaglar KwikPen 100 units/mL subcutaneous solution: 20 unit(s) subcutaneous once a day (at bedtime)  Biktarvy 50 mg-200 mg-25 mg oral tablet: 1 tab(s) orally once a day  Cardiac Rehab: 2-3 times a week for 12 weeks  cholecalciferol 1250 mcg (50,000 intl units) oral capsule: 1 cap(s) orally once a week  hydroCHLOROthiazide 12.5 mg oral capsule: 1 cap(s) orally once a day  Januvia 100 mg oral tablet: 1 tab(s) orally once a day  Jardiance 25 mg oral tablet: 1 tab(s) orally once a day  latanoprost 0.005% preservative-free ophthalmic solution: 1 drop(s) to each affected eye once a day (at bedtime)  metFORMIN 1000 mg oral tablet: 1 tab(s) orally 2 times a day  metoprolol tartrate 50 mg oral tablet: 1 tab(s) orally 2 times a day  Norvasc 10 mg oral tablet: 1 tab(s) orally once a day  Ozempic 2 mg/1.5 mL (0.25 mg or 0.5 mg dose) subcutaneous solution: 0.5 milligram(s) subcutaneously once a week  PriLOSEC 40 mg oral delayed release capsule: 1 cap(s) orally once a day  repaglinide 1 mg oral tablet: 1 tab(s) orally 3 times a day   Actos 30 mg oral tablet: 1 tab(s) orally once a day  aspirin 81 mg oral tablet, chewable: 1 tab(s) chewed once a day  atorvastatin 10 mg oral tablet: 1 tab(s) orally once a day  Basaglar KwikPen 100 units/mL subcutaneous solution: 20 unit(s) subcutaneous once a day (at bedtime)  Biktarvy 50 mg-200 mg-25 mg oral tablet: 1 tab(s) orally once a day  Cardiac Rehab: 2-3 times a week for 12 weeks  cholecalciferol 1250 mcg (50,000 intl units) oral capsule: 1 cap(s) orally once a week  hydroCHLOROthiazide 12.5 mg oral capsule: 1 cap(s) orally once a day  Januvia 100 mg oral tablet: 1 tab(s) orally once a day  latanoprost 0.005% preservative-free ophthalmic solution: 1 drop(s) to each affected eye once a day (at bedtime)  metFORMIN 1000 mg oral tablet: 1 tab(s) orally 2 times a day  metoprolol tartrate 50 mg oral tablet: 1 tab(s) orally 2 times a day  Norvasc 10 mg oral tablet: 1 tab(s) orally once a day  PriLOSEC 40 mg oral delayed release capsule: 1 cap(s) orally once a day   aspirin 81 mg oral tablet, chewable: 1 tab(s) chewed once a day  atorvastatin 10 mg oral tablet: 4 tab(s) orally once a day  Basaglar KwikPen 100 units/mL subcutaneous solution: 15 unit(s) subcutaneous once a day (at bedtime)  Biktarvy 50 mg-200 mg-25 mg oral tablet: 1 tab(s) orally once a day  Cardiac Rehab: 2-3 times a week for 12 weeks  cholecalciferol 1250 mcg (50,000 intl units) oral capsule: 1 cap(s) orally once a week  clopidogrel 75 mg oral tablet: 1 tab(s) orally once a day  hydroCHLOROthiazide 12.5 mg oral capsule: 1 cap(s) orally once a day  latanoprost 0.005% preservative-free ophthalmic solution: 1 drop(s) to each affected eye once a day (at bedtime)  metFORMIN 1000 mg oral tablet: 1 tab(s) orally 2 times a day  metoprolol tartrate 50 mg oral tablet: 1 tab(s) orally 2 times a day  Norvasc 10 mg oral tablet: 1 tab(s) orally once a day  PriLOSEC 40 mg oral delayed release capsule: 1 cap(s) orally once a day   aspirin 81 mg oral tablet, chewable: 1 tab(s) chewed once a day  atorvastatin 10 mg oral tablet: 4 tab(s) orally once a day  Basaglar KwikPen 100 units/mL subcutaneous solution: 15 unit(s) subcutaneous once a day (at bedtime)  Biktarvy 50 mg-200 mg-25 mg oral tablet: 1 tab(s) orally once a day  cholecalciferol 1250 mcg (50,000 intl units) oral capsule: 1 cap(s) orally once a week  clopidogrel 75 mg oral tablet: 1 tab(s) orally once a day  hydroCHLOROthiazide 12.5 mg oral capsule: 1 cap(s) orally once a day  latanoprost 0.005% preservative-free ophthalmic solution: 1 drop(s) to each affected eye once a day (at bedtime)  metFORMIN 1000 mg oral tablet: 1 tab(s) orally 2 times a day  metoprolol tartrate 50 mg oral tablet: 1 tab(s) orally 2 times a day  Norvasc 10 mg oral tablet: 1 tab(s) orally once a day  PriLOSEC 40 mg oral delayed release capsule: 1 cap(s) orally once a day   aspirin 81 mg oral tablet, chewable: 1 tab(s) chewed once a day  atorvastatin 10 mg oral tablet: 4 tab(s) orally once a day  Basaglar KwikPen 100 units/mL subcutaneous solution: 15 unit(s) subcutaneous once a day (at bedtime)  Biktarvy 50 mg-200 mg-25 mg oral tablet: 1 tab(s) orally once a day  cholecalciferol 1250 mcg (50,000 intl units) oral capsule: 1 cap(s) orally once a week  clopidogrel 75 mg oral tablet: 1 tab(s) orally once a day  hydroCHLOROthiazide 12.5 mg oral capsule: 1 cap(s) orally once a day  latanoprost 0.005% preservative-free ophthalmic solution: 1 drop(s) to each affected eye once a day (at bedtime)  metFORMIN 1000 mg oral tablet: 1 tab(s) orally 2 times a day  metoprolol tartrate 50 mg oral tablet: 1 tab(s) orally 2 times a day  Norvasc 10 mg oral tablet: 1 tab(s) orally once a day  Ozempic 2 mg/1.5 mL (0.25 mg or 0.5 mg dose) subcutaneous solution: 0.5 milligram(s) subcutaneously once a week  PriLOSEC 40 mg oral delayed release capsule: 1 cap(s) orally once a day

## 2025-03-06 NOTE — CONSULT NOTE ADULT - ASSESSMENT
Patient is a 76 y/o F with PMHx of HIV (on HAART), HTN, Asthma, T2DM (on insulin), HLD who to the ER as a transfer from [facility] w/ concern for elevated cardiac enzymes. Patient endorses a 13 day history of epigastric pain and chest discomfort. Patient has a difficult time describing the pain, but it has seemingly been constant and worsening up until day of presentation to . Patient states she went to see her  @1PM for HIV f/u when an argument commenced and the patient pegan having severe subseternal chest pain w/ asssociated SOB. EMS activated, patient was brought to . There, AVSS, ECG NSR. hsTroponin trend by their assay 254 -> 271. Labs otherwise only notable for mild hypoK, Patient was loaded with  at  transferred to [facility] for further cardiac workup.     On arrival to [facility] ED AVSS. Patient stated that her pain had improved. Had no active complaints at time of evaluation. CBC, CMP WNL. First in-house hsTrop noted at 294. Cardiology are consulted with concern for NSTEMI.    #elevated cardiac enzymes  - Recent history of substernal/epigastric pain w/ associated SOB. Worsened with exertion/emotional distress  - S/p ASA load at OSH, started on heparin ggt as NS  - C/w heparin drip, NSTEMI protocol  - Obtain TTE  - ASA 81 mg QD starting 3/6  - Add-on pro-BNP in house  - Obtain lipids, A1c  - Will discuss with IC in the AM for possible add-on [facility] Patient is a 76 y/o F with PMHx of HIV (on HAART), HTN, Asthma, T2DM (on insulin), HLD who to the ER as a transfer from [facility] w/ concern for elevated cardiac enzymes. Patient endorses a 13 day history of epigastric pain and chest discomfort. Patient has a difficult time describing the pain, but it has seemingly been constant and worsening up until day of presentation to . Patient states she went to see her  @1PM for HIV f/u when an argument commenced and the patient pegan having severe subseternal chest pain w/ asssociated SOB. EMS activated, patient was brought to . There, AVSS, ECG NSR. hsTroponin trend by their assay 254 -> 271. Labs otherwise only notable for mild hypoK, Patient was loaded with  at  transferred to [facility] for further cardiac workup.     On arrival to [facility] ED AVSS. Patient stated that her pain had improved. Had no active complaints at time of evaluation. CBC, CMP WNL. First in-house hsTrop noted at 294. Cardiology are consulted with concern for NSTEMI.    #elevated cardiac enzymes  - Recent history of substernal/epigastric pain w/ associated SOB. Worsened with exertion/emotional distress  - S/p ASA load at OSH, started on heparin ggt as NS  - C/w heparin drip, NSTEMI protocol  - Obtain TTE  - ASA 81 mg QD starting 3/6  - Add-on pro-BNP in house  - Obtain lipids, A1c  - Trend troponins q3 hours to peak  - Will discuss with IC in the AM for possible add-on [facility]

## 2025-03-06 NOTE — H&P ADULT - NSHPPHYSICALEXAM_GEN_ALL_CORE
T(C): 36.9 (03-06-25 @ 04:16), Max: 37.3 (03-05-25 @ 21:58)  HR: 92 (03-06-25 @ 04:16) (83 - 93)  BP: 123/70 (03-06-25 @ 04:16) (123/70 - 155/82)  RR: 18 (03-06-25 @ 04:16) (17 - 18)  SpO2: 97% (03-06-25 @ 04:16) (93% - 97%)    CONSTITUTIONAL: Well groomed, no apparent distress  EYES: PERRLA and symmetric, EOMI, No conjunctival or scleral injection, non-icteric  ENMT: Oral mucosa with moist membranes. Normal dentition; no pharyngeal injection or exudates  NECK: Supple, symmetric and without tracheal deviation   RESP: No respiratory distress, no use of accessory muscles; CTA b/l, no WRR  CV: RRR, +S1S2, no MRG; no JVD; no peripheral edema  GI: Soft, NT, ND, no rebound, no guarding; no palpable masses   MSK: Normal ROM without pain, no spinal tenderness, normal muscle strength/tone  SKIN: No rashes or ulcers noted; no subcutaneous nodules or induration palpable  NEURO: sensation intact in upper and lower extremities b/l to light touch , strength grossly intact   PSYCH: Appropriate insight/judgment; A+O x 3, mood and affect appropriate, recent/remote memory intact

## 2025-03-06 NOTE — DISCHARGE NOTE PROVIDER - ATTENDING DISCHARGE PHYSICAL EXAMINATION:
PHYSICAL EXAM:  GENERAL:  Well appearing, in NAD  HEENT: Normocephalic, atraumatic, no conjunctival injection  CHEST/LUNG: CTA B/L. No w/r/r.  HEART: Reg rate and rhythm. No m/r/g.   ABDOMEN: BS normoactive, no TTP  EXTREMITIES: No LE edema, no cyanosis or clubbing  PSYCH: AAOx3, appropriate affect  NEUROLOGY: No new FND  SKIN: No rashes or lesions

## 2025-03-06 NOTE — DISCHARGE NOTE PROVIDER - NSDCFUADDINST_GEN_ALL_CORE_FT
Please see preprinted discharge instruction sheet    We have provided you with a prescription for cardiac rehab which is medically supervised exercise program for your heart and has been shown to improve the quantity and quality of life of people with heart disease like yours. You should attend cardiac rehab 3 times per week for 12 weeks. We have provided you with a list of nearby facilities. Please call your insurance carrier to determine which of these facilities are covered under your plan. Please bring this prescription with you to your follow up appointment with your cardiologist who can then further assist you to enroll into a cardiac rehab program.

## 2025-03-06 NOTE — H&P ADULT - HISTORY OF PRESENT ILLNESS
76 yo f with h/o HTN, DM2, HLD, HIV, asthma presented after an argument with her outpt HIV provider developed chest pain . Patient was seen at OSH and transferred to [facility] for further card eval. Pt reports she has been having substernal chest pain - pressure like, nonradiating for about 2 weeks. slight worse with exertion when going up stairs. some MURDOCK. denies any positional or orthopnea or leg swelling. Pain suddenly got worse yesterday while at outpt provider office during conversation. unable to tell me how long symptoms lasted. this am still has some chest discomfort but contributes to "gas." Per pt no previous known h/o heart dz or card w/u such as echo, stress, angio     Overnight most recent VS afebrile, 123/70, 92, 18, 97% RA . at time seen visible discomfort due to reflux like symptoms intermittently. denies any nausea or vomiting.

## 2025-03-06 NOTE — ED ADULT NURSE REASSESSMENT NOTE - NS ED NURSE REASSESS COMMENT FT1
Patient provided with Turkey sandwich, ok to eat per MD Magallon. Patient denies allergies at present.

## 2025-03-06 NOTE — H&P ADULT - PROBLEM SELECTOR PLAN 2
Cont with home BP meds -  Metoprolol, norvasc   Pt has bottle of HCTZ 12.5mg daily though no record at her pharm - [facility] [place] -will hold for now

## 2025-03-06 NOTE — CHART NOTE - NSCHARTNOTEFT_GEN_A_CORE
Cardiac Rehab (STEMI/NSTEMI/ACS/Unstable Angina/CHF/Chronic Stable Angina/Heart Surgery (CABG,Valve)/Post PCI):              *Education on benefits of Cardiac Rehab provided to patient: Yes         *Referral and Prescription Given for Cardiac Rehab : Yes         *Pt given list of locations & instructed to contact their insurance company to review list of participating providers         *Pt instructed to bring Cardiac Rehab prescription with them to Cardiology Follow up appointment for assistance with enrollment: Yes         *Pt discharged with copies detail cardiovascular history, medications, testing/treatments Pt s/p PCI/KY x 1 and POBA x 2 to mRCA 90% via RRA with 3/10 chest pain right sided initially with no ecg changes developed dizziness now resolved post IVF and chest pain free.  Discussed post cath care and cardiac rehab and medication review and need to take her medication without interruption.  Pt verbalized understanding via [place]  Ja ID#[id number] Post care sheet and Cardiac rehab referral on chart in d/c folder.     Cardiac Rehab (STEMI/NSTEMI/ACS/Unstable Angina/CHF/Chronic Stable Angina/Heart Surgery (CABG,Valve)/Post PCI):              *Education on benefits of Cardiac Rehab provided to patient: Yes         *Referral and Prescription Given for Cardiac Rehab : Yes         *Pt given list of locations & instructed to contact their insurance company to review list of participating providers         *Pt instructed to bring Cardiac Rehab prescription with them to Cardiology Follow up appointment for assistance with enrollment: Yes         *Pt discharged with copies detail cardiovascular history, medications, testing/treatments

## 2025-03-06 NOTE — DISCHARGE NOTE PROVIDER - NSFOLLOWUPCLINICS_GEN_ALL_ED_FT
[facility] General Internal Medicine  General Internal Medicine  [address] [place] [id number]  Phone: [phone]  Fax:   Follow Up Time: 2 weeks    [facility]  Cardiology  [address] [facility], [address]  [place] [address]  Phone: [phone]  Fax:   Follow Up Time: 1 week    [facility]  Endocrinology  [address] [place] [address]  Phone: [phone]  Fax: [phone]  Follow Up Time: 2 weeks

## 2025-03-06 NOTE — DISCHARGE NOTE PROVIDER - NSDCACTIVITY_GEN_ALL_CORE
Showering allowed/Stairs allowed/Walking - Indoors allowed/No heavy lifting/straining/Walking - Outdoors allowed Showering allowed/Stairs allowed/Walking - Indoors allowed/No heavy lifting/straining/Walking - Outdoors allowed/Activity as tolerated

## 2025-03-07 ENCOUNTER — RESULT REVIEW (OUTPATIENT)
Age: 75
End: 2025-03-07

## 2025-03-07 VITALS
DIASTOLIC BLOOD PRESSURE: 68 MMHG | RESPIRATION RATE: 18 BRPM | SYSTOLIC BLOOD PRESSURE: 115 MMHG | HEART RATE: 79 BPM | OXYGEN SATURATION: 94 % | TEMPERATURE: 99 F

## 2025-03-07 DIAGNOSIS — Z91.148 PATIENT'S OTHER NONCOMPLIANCE WITH MEDICATION REGIMEN FOR OTHER REASON: ICD-10-CM

## 2025-03-07 DIAGNOSIS — E11.65 TYPE 2 DIABETES MELLITUS WITH HYPERGLYCEMIA: ICD-10-CM

## 2025-03-07 DIAGNOSIS — E04.9 NONTOXIC GOITER, UNSPECIFIED: ICD-10-CM

## 2025-03-07 LAB
A1C WITH ESTIMATED AVERAGE GLUCOSE RESULT: 9.4 % — HIGH (ref 4–5.6)
ANION GAP SERPL CALC-SCNC: 15 MMOL/L — SIGNIFICANT CHANGE UP (ref 5–17)
BUN SERPL-MCNC: 10 MG/DL — SIGNIFICANT CHANGE UP (ref 7–23)
CALCIUM SERPL-MCNC: 8.9 MG/DL — SIGNIFICANT CHANGE UP (ref 8.4–10.5)
CHLORIDE SERPL-SCNC: 103 MMOL/L — SIGNIFICANT CHANGE UP (ref 96–108)
CK SERPL-CCNC: 82 U/L — SIGNIFICANT CHANGE UP (ref 25–170)
CO2 SERPL-SCNC: 20 MMOL/L — LOW (ref 22–31)
CREAT SERPL-MCNC: 0.64 MG/DL — SIGNIFICANT CHANGE UP (ref 0.5–1.3)
EGFR: 93 ML/MIN/1.73M2 — SIGNIFICANT CHANGE UP
EGFR: 93 ML/MIN/1.73M2 — SIGNIFICANT CHANGE UP
ESTIMATED AVERAGE GLUCOSE: 223 MG/DL — HIGH (ref 68–114)
GLUCOSE BLDC GLUCOMTR-MCNC: 151 MG/DL — HIGH (ref 70–99)
GLUCOSE BLDC GLUCOMTR-MCNC: 158 MG/DL — HIGH (ref 70–99)
GLUCOSE SERPL-MCNC: 161 MG/DL — HIGH (ref 70–99)
HCT VFR BLD CALC: 36.2 % — SIGNIFICANT CHANGE UP (ref 34.5–45)
HGB BLD-MCNC: 12.4 G/DL — SIGNIFICANT CHANGE UP (ref 11.5–15.5)
INR BLD: 1.01 RATIO — SIGNIFICANT CHANGE UP (ref 0.85–1.16)
MCHC RBC-ENTMCNC: 30.8 PG — SIGNIFICANT CHANGE UP (ref 27–34)
MCHC RBC-ENTMCNC: 34.3 G/DL — SIGNIFICANT CHANGE UP (ref 32–36)
MCV RBC AUTO: 89.8 FL — SIGNIFICANT CHANGE UP (ref 80–100)
NRBC BLD AUTO-RTO: 0 /100 WBCS — SIGNIFICANT CHANGE UP (ref 0–0)
PLATELET # BLD AUTO: 195 K/UL — SIGNIFICANT CHANGE UP (ref 150–400)
POTASSIUM SERPL-MCNC: 3.8 MMOL/L — SIGNIFICANT CHANGE UP (ref 3.5–5.3)
POTASSIUM SERPL-SCNC: 3.8 MMOL/L — SIGNIFICANT CHANGE UP (ref 3.5–5.3)
PROTHROM AB SERPL-ACNC: 11.5 SEC — SIGNIFICANT CHANGE UP (ref 9.9–13.4)
RBC # BLD: 4.03 M/UL — SIGNIFICANT CHANGE UP (ref 3.8–5.2)
RBC # FLD: 15 % — HIGH (ref 10.3–14.5)
SODIUM SERPL-SCNC: 138 MMOL/L — SIGNIFICANT CHANGE UP (ref 135–145)
TSH SERPL-MCNC: 0.58 UIU/ML — SIGNIFICANT CHANGE UP (ref 0.27–4.2)
WBC # BLD: 5.9 K/UL — SIGNIFICANT CHANGE UP (ref 3.8–10.5)
WBC # FLD AUTO: 5.9 K/UL — SIGNIFICANT CHANGE UP (ref 3.8–10.5)

## 2025-03-07 PROCEDURE — 93005 ELECTROCARDIOGRAM TRACING: CPT

## 2025-03-07 PROCEDURE — 80048 BASIC METABOLIC PNL TOTAL CA: CPT

## 2025-03-07 PROCEDURE — C1753: CPT

## 2025-03-07 PROCEDURE — C1887: CPT

## 2025-03-07 PROCEDURE — 84484 ASSAY OF TROPONIN QUANT: CPT

## 2025-03-07 PROCEDURE — C9600: CPT | Mod: RC

## 2025-03-07 PROCEDURE — 99232 SBSQ HOSP IP/OBS MODERATE 35: CPT

## 2025-03-07 PROCEDURE — C1894: CPT

## 2025-03-07 PROCEDURE — 82962 GLUCOSE BLOOD TEST: CPT

## 2025-03-07 PROCEDURE — 80061 LIPID PANEL: CPT

## 2025-03-07 PROCEDURE — 83036 HEMOGLOBIN GLYCOSYLATED A1C: CPT

## 2025-03-07 PROCEDURE — 93458 L HRT ARTERY/VENTRICLE ANGIO: CPT | Mod: 59

## 2025-03-07 PROCEDURE — 86850 RBC ANTIBODY SCREEN: CPT

## 2025-03-07 PROCEDURE — C1769: CPT

## 2025-03-07 PROCEDURE — 85027 COMPLETE CBC AUTOMATED: CPT

## 2025-03-07 PROCEDURE — C1874: CPT

## 2025-03-07 PROCEDURE — 84443 ASSAY THYROID STIM HORMONE: CPT

## 2025-03-07 PROCEDURE — 93306 TTE W/DOPPLER COMPLETE: CPT

## 2025-03-07 PROCEDURE — 99223 1ST HOSP IP/OBS HIGH 75: CPT

## 2025-03-07 PROCEDURE — 86900 BLOOD TYPING SEROLOGIC ABO: CPT

## 2025-03-07 PROCEDURE — 96374 THER/PROPH/DIAG INJ IV PUSH: CPT

## 2025-03-07 PROCEDURE — 71045 X-RAY EXAM CHEST 1 VIEW: CPT

## 2025-03-07 PROCEDURE — 93306 TTE W/DOPPLER COMPLETE: CPT | Mod: 26

## 2025-03-07 PROCEDURE — 82550 ASSAY OF CK (CPK): CPT

## 2025-03-07 PROCEDURE — 85730 THROMBOPLASTIN TIME PARTIAL: CPT

## 2025-03-07 PROCEDURE — 92978 ENDOLUMINL IVUS OCT C 1ST: CPT | Mod: RC

## 2025-03-07 PROCEDURE — 85610 PROTHROMBIN TIME: CPT

## 2025-03-07 PROCEDURE — C1725: CPT

## 2025-03-07 PROCEDURE — 99239 HOSP IP/OBS DSCHRG MGMT >30: CPT

## 2025-03-07 PROCEDURE — 86901 BLOOD TYPING SEROLOGIC RH(D): CPT

## 2025-03-07 PROCEDURE — 82553 CREATINE MB FRACTION: CPT

## 2025-03-07 PROCEDURE — 99291 CRITICAL CARE FIRST HOUR: CPT | Mod: 25

## 2025-03-07 RX ORDER — ASPIRIN 325 MG
1 TABLET ORAL
Refills: 0 | DISCHARGE

## 2025-03-07 RX ORDER — ORAL SEMAGLUTIDE 14 MG/1
0.5 TABLET ORAL
Qty: 1 | Refills: 0
Start: 2025-03-07 | End: 2025-04-05

## 2025-03-07 RX ORDER — EMPAGLIFLOZIN 25 MG/1
1 TABLET, FILM COATED ORAL
Refills: 0 | DISCHARGE

## 2025-03-07 RX ORDER — INFLUENZA A VIRUS A/IDAHO/07/2018 (H1N1) ANTIGEN (MDCK CELL DERIVED, PROPIOLACTONE INACTIVATED, INFLUENZA A VIRUS A/INDIANA/08/2018 (H3N2) ANTIGEN (MDCK CELL DERIVED, PROPIOLACTONE INACTIVATED), INFLUENZA B VIRUS B/SINGAPORE/INFTT-16-0610/2016 ANTIGEN (MDCK CELL DERIVED, PROPIOLACTONE INACTIVATED), INFLUENZA B VIRUS B/IOWA/06/2017 ANTIGEN (MDCK CELL DERIVED, PROPIOLACTONE INACTIVATED) 15; 15; 15; 15 UG/.5ML; UG/.5ML; UG/.5ML; UG/.5ML
0.5 INJECTION, SUSPENSION INTRAMUSCULAR ONCE
Refills: 0 | Status: DISCONTINUED | OUTPATIENT
Start: 2025-03-07 | End: 2025-03-07

## 2025-03-07 RX ORDER — CLOPIDOGREL BISULFATE 75 MG/1
1 TABLET, FILM COATED ORAL
Qty: 30 | Refills: 0
Start: 2025-03-07 | End: 2025-04-05

## 2025-03-07 RX ORDER — PIOGLITAZONE HYDROCHLORIDE 15 MG/1
1 TABLET ORAL
Refills: 0 | DISCHARGE

## 2025-03-07 RX ORDER — INSULIN GLARGINE-YFGN 100 [IU]/ML
15 INJECTION, SOLUTION SUBCUTANEOUS
Qty: 0 | Refills: 0 | DISCHARGE

## 2025-03-07 RX ORDER — ATORVASTATIN CALCIUM 80 MG/1
4 TABLET, FILM COATED ORAL
Qty: 120 | Refills: 0
Start: 2025-03-07 | End: 2025-04-05

## 2025-03-07 RX ORDER — ATORVASTATIN CALCIUM 80 MG/1
1 TABLET, FILM COATED ORAL
Refills: 0 | DISCHARGE

## 2025-03-07 RX ORDER — SITAGLIPTIN 100 MG/1
1 TABLET, FILM COATED ORAL
Refills: 0 | DISCHARGE

## 2025-03-07 RX ORDER — REPAGLINIDE 1 MG/1
1 TABLET ORAL
Refills: 0 | DISCHARGE

## 2025-03-07 RX ORDER — ORAL SEMAGLUTIDE 14 MG/1
0.5 TABLET ORAL
Refills: 0 | DISCHARGE

## 2025-03-07 RX ORDER — ASPIRIN 325 MG
1 TABLET ORAL
Qty: 30 | Refills: 0
Start: 2025-03-07 | End: 2025-04-05

## 2025-03-07 RX ADMIN — INSULIN LISPRO 1: 100 INJECTION, SOLUTION INTRAVENOUS; SUBCUTANEOUS at 09:33

## 2025-03-07 RX ADMIN — Medication 40 MILLIGRAM(S): at 09:33

## 2025-03-07 RX ADMIN — METOPROLOL SUCCINATE 50 MILLIGRAM(S): 50 TABLET, EXTENDED RELEASE ORAL at 06:28

## 2025-03-07 RX ADMIN — CLOPIDOGREL BISULFATE 75 MILLIGRAM(S): 75 TABLET, FILM COATED ORAL at 12:29

## 2025-03-07 RX ADMIN — Medication 81 MILLIGRAM(S): at 12:29

## 2025-03-07 RX ADMIN — AMLODIPINE BESYLATE 10 MILLIGRAM(S): 10 TABLET ORAL at 06:28

## 2025-03-07 RX ADMIN — METOPROLOL SUCCINATE 50 MILLIGRAM(S): 50 TABLET, EXTENDED RELEASE ORAL at 17:52

## 2025-03-07 RX ADMIN — INSULIN LISPRO 1: 100 INJECTION, SOLUTION INTRAVENOUS; SUBCUTANEOUS at 13:16

## 2025-03-07 RX ADMIN — BICTEGRAVIR SODIUM, EMTRICITABINE, AND TENOFOVIR ALAFENAMIDE FUMARATE 1 TABLET(S): 30; 120; 15 TABLET ORAL at 12:29

## 2025-03-07 NOTE — PROGRESS NOTE ADULT - SUBJECTIVE AND OBJECTIVE BOX
Interventional Cardiology Post Cath Progress Note: s/p KY to mRCA on 3/6/25                Subjective:   Patient feels well- no current complaints- Denies chest pain, shortness of breath. Denies pain, numbness, tingling or swelling around wrist access site.    Tele 24hrs:  SR 70s    MEDICATIONS  (STANDING):  amLODIPine   Tablet 10 milliGRAM(s) Oral daily  aspirin  chewable 81 milliGRAM(s) Oral daily  atorvastatin 40 milliGRAM(s) Oral at bedtime  bictegravir 50 mG/emtricitabine 200 mG/tenofovir alafenamide 25 mG (BIKTARVY) 1 Tablet(s) Oral daily  clopidogrel Tablet 75 milliGRAM(s) Oral daily  dextrose 5%. 1000 milliLiter(s) (100 mL/Hr) IV Continuous <Continuous>  dextrose 5%. 1000 milliLiter(s) (50 mL/Hr) IV Continuous <Continuous>  dextrose 50% Injectable 25 Gram(s) IV Push once  dextrose 50% Injectable 12.5 Gram(s) IV Push once  dextrose 50% Injectable 25 Gram(s) IV Push once  glucagon  Injectable 1 milliGRAM(s) IntraMuscular once  influenza  Vaccine (HIGH DOSE) 0.5 milliLiter(s) IntraMuscular once  insulin glargine Injectable (LANTUS) 15 Unit(s) SubCutaneous at bedtime  insulin lispro (ADMELOG) corrective regimen sliding scale   SubCutaneous three times a day before meals  insulin lispro (ADMELOG) corrective regimen sliding scale   SubCutaneous at bedtime  latanoprost 0.005% Ophthalmic Solution 1 Drop(s) Both EYES at bedtime  metoprolol tartrate 50 milliGRAM(s) Oral two times a day  pantoprazole    Tablet 40 milliGRAM(s) Oral before breakfast  sodium chloride 0.9%. 1000 milliLiter(s) (100 mL/Hr) IV Continuous <Continuous>    MEDICATIONS  (PRN):  acetaminophen     Tablet .. 650 milliGRAM(s) Oral every 6 hours PRN Temp greater or equal to 38C (100.4F), Mild Pain (1 - 3)  aluminum hydroxide/magnesium hydroxide/simethicone Suspension 30 milliLiter(s) Oral every 4 hours PRN Dyspepsia  dextrose Oral Gel 15 Gram(s) Oral once PRN Blood Glucose LESS THAN 70 milliGRAM(s)/deciliter  melatonin 3 milliGRAM(s) Oral at bedtime PRN Insomnia      Objective:  Vital Signs Last 24 Hrs  T(C): 36.3 (07 Mar 2025 08:11), Max: 36.9 (06 Mar 2025 18:35)  T(F): 97.3 (07 Mar 2025 08:11), Max: 98.4 (06 Mar 2025 18:35)  HR: 79 (07 Mar 2025 08:11) (79 - 105)  BP: 134/79 (07 Mar 2025 08:11) (133/81 - 160/90)  BP(mean): --  RR: 18 (07 Mar 2025 08:11) (15 - 20)  SpO2: 97% (07 Mar 2025 08:11) (95% - 99%)    Parameters below as of 07 Mar 2025 08:11  Patient On (Oxygen Delivery Method): room air        03-07-25 @ 07:01  -  03-07-25 @ 10:37  --------------------------------------------------------  IN: 100 mL / OUT: 0 mL / NET: 100 mL                              12.4   5.90  )-----------( 195      ( 07 Mar 2025 06:53 )             36.2     03-07    138  |  103  |  10  ----------------------------<  161[H]  3.8   |  20[L]  |  0.64    Ca    8.9      07 Mar 2025 06:52    TPro  6.7  /  Alb  3.8  /  TBili  0.4  /  DBili  x   /  AST  18  /  ALT  16  /  AlkPhos  85  03-05    PT/INR - ( 07 Mar 2025 06:53 )   PT: 11.5 sec;   INR: 1.01 ratio         PTT - ( 06 Mar 2025 07:01 )  PTT:60.0 sec  Urinalysis Basic - ( 07 Mar 2025 06:52 )    Color: x / Appearance: x / SG: x / pH: x  Gluc: 161 mg/dL / Ketone: x  / Bili: x / Urobili: x   Blood: x / Protein: x / Nitrite: x   Leuk Esterase: x / RBC: x / WBC x   Sq Epi: x / Non Sq Epi: x / Bacteria: x        < from: TTE Limited W or WO Ultrasound Enhancing Agent (03.07.25 @ 08:31) >     CONCLUSIONS:      1. Left ventricular cavity is normal in size. Left ventricular wall thickness is normal. Left ventricular systolic function is normal with an ejection fraction visually estimated at 55 %. There are no regional wall motion abnormalities seen.    < end of copied text >      Physical Exam:  No apparent distress, alert and oriented times three, appropriate affect  JVD is not elevated, supple  Clear to auscultation with no wheezing, ronchi or crackles  Regular rate and rhythm with no murmur, rub or gallop  Soft, non-tender, non-distended  Right Upper Extremity: Radial site soft, non tender, no bleeding or hematoma; +2 palpable radial pulse  No clubbing, cyanosis or edema          Assessment/Plan:  HPI:  74 yo f with h/o HTN, DM2, HLD, HIV, asthma presented after an argument with her outpt HIV provider developed chest pain . Patient was seen at OSH and transferred to [facility] for further card eval. Pt reports she has been having substernal chest pain - pressure like, nonradiating for about 2 weeks. slight worse with exertion when going up stairs. some MURDOCK. denies any positional or orthopnea or leg swelling. Pain suddenly got worse yesterday while at outpt provider office during conversation. unable to tell me how long symptoms lasted. this am still has some chest discomfort but contributes to "gas." Per pt no previous known h/o heart dz or card w/u such as echo, stress, angio     Overnight most recent VS afebrile, 123/70, 92, 18, 97% RA . at time seen visible discomfort due to reflux like symptoms intermittently. denies any nausea or vomiting.  (06 Mar 2025 09:43)    - Procedure site stable.   - Continue DAPT (aspirin 81mg and clopidogrel 75mg)  - Continue atorvastatin  - Recommend a heart healthy diet which includes a variety of fruits and vegetables, whole grains, low fat dairy products, legumes and skinless poulty and fish; food prepared with little or no salt and minimize processed foods  - Avoid using NSAIDs  (Aleve, Motrin, ibuprofen, naproxen) while on DAPT, please utilize Tylenol for pain control (not to exceed 4gm in 24 hours)  -Follow up with primary cardiologist in 1-2 weeks  -Please make sure DAPT is prescribed to pt's preferred pharmacy on discharge  -Keep K>4 Mg>2  -For all general cardiology questions please contact patient's primary cards team   - Care per primary team    Please check [email or web address] password [email or web address] for cardiology service schedule and contact information via TEAMS.    ANTONIO MazariegosC   Interventional Cardiology Post Cath Progress Note: s/p KY to mRCA on 3/6/25                Subjective:   Patient feels well- no current complaints- Denies chest pain, shortness of breath. Denies pain, numbness, tingling or swelling around wrist access site.   ID Shilpa #[id number]  Tele 24hrs:  SR 70s    MEDICATIONS  (STANDING):  amLODIPine   Tablet 10 milliGRAM(s) Oral daily  aspirin  chewable 81 milliGRAM(s) Oral daily  atorvastatin 40 milliGRAM(s) Oral at bedtime  bictegravir 50 mG/emtricitabine 200 mG/tenofovir alafenamide 25 mG (BIKTARVY) 1 Tablet(s) Oral daily  clopidogrel Tablet 75 milliGRAM(s) Oral daily  dextrose 5%. 1000 milliLiter(s) (100 mL/Hr) IV Continuous <Continuous>  dextrose 5%. 1000 milliLiter(s) (50 mL/Hr) IV Continuous <Continuous>  dextrose 50% Injectable 25 Gram(s) IV Push once  dextrose 50% Injectable 12.5 Gram(s) IV Push once  dextrose 50% Injectable 25 Gram(s) IV Push once  glucagon  Injectable 1 milliGRAM(s) IntraMuscular once  influenza  Vaccine (HIGH DOSE) 0.5 milliLiter(s) IntraMuscular once  insulin glargine Injectable (LANTUS) 15 Unit(s) SubCutaneous at bedtime  insulin lispro (ADMELOG) corrective regimen sliding scale   SubCutaneous three times a day before meals  insulin lispro (ADMELOG) corrective regimen sliding scale   SubCutaneous at bedtime  latanoprost 0.005% Ophthalmic Solution 1 Drop(s) Both EYES at bedtime  metoprolol tartrate 50 milliGRAM(s) Oral two times a day  pantoprazole    Tablet 40 milliGRAM(s) Oral before breakfast  sodium chloride 0.9%. 1000 milliLiter(s) (100 mL/Hr) IV Continuous <Continuous>    MEDICATIONS  (PRN):  acetaminophen     Tablet .. 650 milliGRAM(s) Oral every 6 hours PRN Temp greater or equal to 38C (100.4F), Mild Pain (1 - 3)  aluminum hydroxide/magnesium hydroxide/simethicone Suspension 30 milliLiter(s) Oral every 4 hours PRN Dyspepsia  dextrose Oral Gel 15 Gram(s) Oral once PRN Blood Glucose LESS THAN 70 milliGRAM(s)/deciliter  melatonin 3 milliGRAM(s) Oral at bedtime PRN Insomnia      Objective:  Vital Signs Last 24 Hrs  T(C): 36.3 (07 Mar 2025 08:11), Max: 36.9 (06 Mar 2025 18:35)  T(F): 97.3 (07 Mar 2025 08:11), Max: 98.4 (06 Mar 2025 18:35)  HR: 79 (07 Mar 2025 08:11) (79 - 105)  BP: 134/79 (07 Mar 2025 08:11) (133/81 - 160/90)  BP(mean): --  RR: 18 (07 Mar 2025 08:11) (15 - 20)  SpO2: 97% (07 Mar 2025 08:11) (95% - 99%)    Parameters below as of 07 Mar 2025 08:11  Patient On (Oxygen Delivery Method): room air        03-07-25 @ 07:01  -  03-07-25 @ 10:37  --------------------------------------------------------  IN: 100 mL / OUT: 0 mL / NET: 100 mL                              12.4   5.90  )-----------( 195      ( 07 Mar 2025 06:53 )             36.2     03-07    138  |  103  |  10  ----------------------------<  161[H]  3.8   |  20[L]  |  0.64    Ca    8.9      07 Mar 2025 06:52    TPro  6.7  /  Alb  3.8  /  TBili  0.4  /  DBili  x   /  AST  18  /  ALT  16  /  AlkPhos  85  03-05    PT/INR - ( 07 Mar 2025 06:53 )   PT: 11.5 sec;   INR: 1.01 ratio         PTT - ( 06 Mar 2025 07:01 )  PTT:60.0 sec  Urinalysis Basic - ( 07 Mar 2025 06:52 )    Color: x / Appearance: x / SG: x / pH: x  Gluc: 161 mg/dL / Ketone: x  / Bili: x / Urobili: x   Blood: x / Protein: x / Nitrite: x   Leuk Esterase: x / RBC: x / WBC x   Sq Epi: x / Non Sq Epi: x / Bacteria: x        < from: TTE Limited W or WO Ultrasound Enhancing Agent (03.07.25 @ 08:31) >     CONCLUSIONS:      1. Left ventricular cavity is normal in size. Left ventricular wall thickness is normal. Left ventricular systolic function is normal with an ejection fraction visually estimated at 55 %. There are no regional wall motion abnormalities seen.    < end of copied text >      Physical Exam:  No apparent distress, alert and oriented times three, appropriate affect  JVD is not elevated, supple  Clear to auscultation with no wheezing, ronchi or crackles  Regular rate and rhythm with no murmur, rub or gallop  Soft, non-tender, non-distended  Right Upper Extremity: Radial site soft, non tender, no bleeding or hematoma; +2 palpable radial pulse  No clubbing, cyanosis or edema          Assessment/Plan:  HPI:  74 yo f with h/o HTN, DM2, HLD, HIV, asthma presented after an argument with her outpt HIV provider developed chest pain . Patient was seen at OSH and transferred to [facility] for further card eval. Pt reports she has been having substernal chest pain - pressure like, nonradiating for about 2 weeks. slight worse with exertion when going up stairs. some MURDOCK. denies any positional or orthopnea or leg swelling. Pain suddenly got worse yesterday while at outpt provider office during conversation. unable to tell me how long symptoms lasted. this am still has some chest discomfort but contributes to "gas." Per pt no previous known h/o heart dz or card w/u such as echo, stress, angio     Overnight most recent VS afebrile, 123/70, 92, 18, 97% RA . at time seen visible discomfort due to reflux like symptoms intermittently. denies any nausea or vomiting.  (06 Mar 2025 09:43)    - Procedure site stable.   - Continue DAPT (aspirin 81mg and clopidogrel 75mg)  - Continue atorvastatin  - Recommend a heart healthy diet which includes a variety of fruits and vegetables, whole grains, low fat dairy products, legumes and skinless poulty and fish; food prepared with little or no salt and minimize processed foods  - Avoid using NSAIDs  (Aleve, Motrin, ibuprofen, naproxen) while on DAPT, please utilize Tylenol for pain control (not to exceed 4gm in 24 hours)  -Follow up with primary cardiologist in 1-2 weeks  -Please make sure DAPT is prescribed to pt's preferred pharmacy on discharge  -Keep K>4 Mg>2  -For all general cardiology questions please contact patient's primary cards team   - Care per primary team    Please check [email or web address] password [email or web address] for cardiology service schedule and contact information via TEAMS.    ANTONIO MazariegosC

## 2025-03-07 NOTE — CONSULT NOTE ADULT - PROBLEM SELECTOR RECOMMENDATION 9
-Test BG ac and hs   -C/w Lantus 15 units q hs  -C/w low dose correction scales ac meals and hs  -Will add meal time insulin if prandial BG >180s  -Please review with pt use of insulin pen. Please document teach back  -Please teach use of new glucose meter prior to discharge. Pt can't read so needs to give teach back on its use  - Needs RD consult  Discharge plan:  Will be determined according to BG/insulin needs/PO intake at time of discharge.  Pt taking multiple DM meds. Requiring low insulin doses while in hospital.  Will likely discharge on: Basal insulin Jardiance, Metformin and Ozempic.  STOP Januvia since pt is already on Ozempic  STOP Actos and repaglanide to simplify regimen and improve adherence. Not sure what meds pt is actually taking but due to age/ illiteracy pt might be overdosing or underdosing if having polypharmacy.  Please write Rxs for: glucose meter/strips/lancets.   Make sure pt has Rx for all DM supplies and insulin/ DM meds.  -Will benefit from home care upon discharge to make sure pt is taking meds as prescribed. Pt doesn't know how to read or write. Can recognize numbers only.

## 2025-03-07 NOTE — PHARMACOTHERAPY INTERVENTION NOTE - COMMENTS
LEONORA Catherine is a 74 yo F w h/o HTN, DM2, HLD, HIV, asthma admitted for ACS/NSTEMI w/u.    Recognized drug interaction between Biktarvy and PRN Maalox. Since pt has not been receiving Maalox, recommended to d/c.     Thank you,  Sylvia Osorio, PharmD, [facility]  Clinical Pharmacist, Infectious Diseases  Available via [facility]   Cell: [phone]

## 2025-03-07 NOTE — CONSULT NOTE ADULT - PROBLEM SELECTOR RECOMMENDATION 3
LDL goal <55 due to DM and MI  Pt   Statin as noted above  C/w low cholesterol diet   Manage per primary team   F/u levels as out pt

## 2025-03-07 NOTE — DISCHARGE NOTE NURSING/CASE MANAGEMENT/SOCIAL WORK - FINANCIAL ASSISTANCE
[facility] provides services at a reduced cost to those who are determined to be eligible through [facility]’s financial assistance program. Information regarding [facility]’s financial assistance program can be found by going to [email or web address] or by calling [phone].

## 2025-03-07 NOTE — CONSULT NOTE ADULT - PROBLEM SELECTOR RECOMMENDATION 4
Chest xray:   Nonspecific superior mediastinal opacity may represent enlarged thyroid. noted enlarge thyroid at PE as well.  Awaiting TSH result.  Please do TFT panel in am   Will need thyroid sono  Needs out pt follow up with endo. Pt made aware.

## 2025-03-07 NOTE — CONSULT NOTE ADULT - ASSESSMENT
Patient is a 74 y/o Mauritanian speaking F, poor historian. illiterate but can recognize numbers. Pt w/h/o uncontrolled T2DM (A1C 9.5%) on multiple DM meds per EMR( Basaglar/Metformin/repaglinide/Actos/Januvia and Ozemopic). No known DM complications. Also h/o HIV (on HAART), HTN, Asthma, HLD. Pt was transferred from [facility] w/ concern for elevated cardiac enzymes. Patient endorsed 2 week h/o worsening epigastric pain and chest discomfort. States she was having trouble walking upstairs with abd pain, gas pain and chest discomfort. Patient states she went to see her  @1PM for HIV f/u when an argument commenced and the patient began having severe substernal chest pain w/ asssociated SOB. EMS activated, patient was brought to . There, AVSS, ECG NSR. hsTroponin trend by their assay 254 -> 271. Labs otherwise only notable for mild hypoK, Patient was loaded with  at  transferred to [facility] for further cardiac workup. Pt found to have NSTEMI and is now s/p cardiac cath with mid RCA sten placement on 3/6/25.   Endocrine consult requested for uncontrolled T2DM. Tolerating POs with BG at goal (100s to 180s) while on present insulin doses. No hypoglycemia. Will follow BG during the day and adjust insulin regimen as needed.      Met with patient and reviewed the following:  -DM complications including MI  -A1c LEVEL: Present and goal.   -Blood glucose goals: 100s to 180s as out pt due to age and comorbidities  -Glucose monitoring frequency: fasting and hs. Needs new glucose meter  -Healthy eating and portion control. Reviewed carbs and portions  -Insulin(s) action, time of administration and side effects.   -Importance of follow up care. Wants to f/u at [facility] since she gets most services/meds for free including housing. Needs endo for DM and thyroid f/u  Pt able to verbalize understanding regarding the need for glucose monitoring, diet, DM meds and follow up care.   Need to review use of insulin pen and new glucose meter prior to discharge to make sure pt is using devices properly     Diet, DASH/TLC:   Sodium & Cholesterol Restricted  Consistent Carbohydrate No Snacks (CSTCHO)     Special Instructions for Nursing:  Sodium & Cholesterol Restricted (03-06-25 @ 16:43) [Active]

## 2025-03-07 NOTE — DISCHARGE NOTE NURSING/CASE MANAGEMENT/SOCIAL WORK - NSDCPEFALRISK_GEN_ALL_CORE
For information on Fall & Injury Prevention, visit: [email or web address] OR  [email or web address] OR  https://www.cdc.gov/steadi/patient.html

## 2025-03-07 NOTE — DISCHARGE NOTE NURSING/CASE MANAGEMENT/SOCIAL WORK - PATIENT PORTAL LINK FT
You can access the FollowMyHealth Patient Portal offered by [facility] by registering at the following website: [email or web address]. By joining "[employer]’s FollowMyHealth portal, you will also be able to view your health information using other applications (apps) compatible with our system.

## 2025-03-07 NOTE — PROGRESS NOTE ADULT - SUBJECTIVE AND OBJECTIVE BOX
CARDIOLOGY ATTENDING PROGRESS NOTE     S:    Denies chest pain or SOB    O:    PE  Vital Signs Last 24 Hrs  T(C): 37.4 (07 Mar 2025 11:40), Max: 37.4 (07 Mar 2025 11:40)  T(F): 99.4 (07 Mar 2025 11:40), Max: 99.4 (07 Mar 2025 11:40)  HR: 79 (07 Mar 2025 11:40) (79 - 105)  BP: 115/68 (07 Mar 2025 11:40) (115/68 - 160/90)  BP(mean): --  RR: 18 (07 Mar 2025 11:40) (15 - 20)  SpO2: 94% (07 Mar 2025 11:40) (94% - 99%)    Parameters below as of 07 Mar 2025 11:40  Patient On (Oxygen Delivery Method): room air    Gen: NAD, breathing comfortably  HEENT: MMM, anicteric sclera  Neck: JVP < 10 cm  Cardiac: RRR, 1/6 systolic murmur  Lungs: Adequate inspiratory effort. CTAB  Abd: soft, NT/ND. +BS  Ext: warm and well perfused. No significant LE edema    Data:  I have personally reviewed all data:  Labs reviewed    CXR  FINDINGS:  The heart is normal in size.  Nonspecific superior mediastinal opacity.  There is no pneumothorax or pleural effusion.  No acute bony abnormality.  IMPRESSION:  Nonspecific superior mediastinal opacity may represent enlarged thyroid.    EKG:  sinus rhythm. T wave inversions consider inferior ischemia    Echo:    [facility]  Diagnostic Findings:   Coronary Angiography   The coronary circulation is right dominant.    LM   Left main artery: Angiography shows mild atherosclerosis.    LAD   Left anterior descending artery: Angiography shows mild  atherosclerosis.  CX   First obtuse marginal: There is a 50 % stenosis.    RCA   Mid right coronary artery: Angiography shows severe atherosclerosis.  There is a 90 % stenosis.    Telemetry  no events    MEDICATIONS  (STANDING):  amLODIPine   Tablet 10 milliGRAM(s) Oral daily  aspirin  chewable 81 milliGRAM(s) Oral daily  atorvastatin 40 milliGRAM(s) Oral at bedtime  bictegravir 50 mG/emtricitabine 200 mG/tenofovir alafenamide 25 mG (BIKTARVY) 1 Tablet(s) Oral daily  clopidogrel Tablet 75 milliGRAM(s) Oral daily  dextrose 5%. 1000 milliLiter(s) (100 mL/Hr) IV Continuous <Continuous>  dextrose 5%. 1000 milliLiter(s) (50 mL/Hr) IV Continuous <Continuous>  dextrose 50% Injectable 25 Gram(s) IV Push once  dextrose 50% Injectable 12.5 Gram(s) IV Push once  dextrose 50% Injectable 25 Gram(s) IV Push once  glucagon  Injectable 1 milliGRAM(s) IntraMuscular once  insulin glargine Injectable (LANTUS) 15 Unit(s) SubCutaneous at bedtime  insulin lispro (ADMELOG) corrective regimen sliding scale   SubCutaneous three times a day before meals  insulin lispro (ADMELOG) corrective regimen sliding scale   SubCutaneous at bedtime  latanoprost 0.005% Ophthalmic Solution 1 Drop(s) Both EYES at bedtime  metoprolol tartrate 50 milliGRAM(s) Oral two times a day  pantoprazole    Tablet 40 milliGRAM(s) Oral before breakfast  sodium chloride 0.9%. 1000 milliLiter(s) (100 mL/Hr) IV Continuous <Continuous>      A/P    DOMENIC AGARWAL is a 75y Female with a history of HIV on HAART, DM, HTN who presented to an outside hospital with chest pain and was found to have an elevated troponin. Due to concern for NSTEMI she was transferred to [facility] for further management.     #NSTE-ACS/NSTEMI s/p; [place] x 1 to mid RCA  #HTN  #HLD  #DM    Continue dual antiplatelet therapy. High intensity statin. Can consolidate metoprolol to metoprolol succinate 100 mg daily. Discussed with the patient the importance of taking dual antiplatelet therapy as prescribed to avoid catastrophic stent thrombosis. Can resume other home antihypertensives if she was taking them    Stable for discharge from a cardiac standpoint    Thank you for allowing us to participate in this patient's care. Please do not hesitate to reach out to our service for any further questions.     Kg Almanzar MD, [facility]  For [facility] Cardiology and Cardiovascular Surgery 24/7 service contact information, please go to [email or web address] ("[email or web address]" to login).

## 2025-03-07 NOTE — CONSULT NOTE ADULT - SUBJECTIVE AND OBJECTIVE BOX
Cardiology Progress Note  ------------------------------------------------------------------------------------------  SUBJECTIVE:   Patient is a 74 y/o F with PMHx of HIV (on HAART), HTN, Asthma, T2DM (on insulin), HLD who to the ER as a transfer from [facility] w/ concern for elevated cardiac enzymes. Patient endorses a 13 day history of epigastric pain and chest discomfort. Patient has a difficult time describing the pain, but it has seemingly been constant and worsening up until day of presentation to . Patient states she went to see her  @1PM for HIV f/u when an argument commenced and the patient pegan having severe subseternal chest pain w/ asssociated SOB. EMS activated, patient was brought to . There, AVSS, ECG NSR. hsTroponin trend by their assay 254 -> 271. Labs otherwise only notable for mild hypoK, Patient was loaded with  at  transferred to [facility] for further cardiac workup.     On arrival to [facility] ED AVSS. Patient stated that her pain had improved. Had no active complaints at time of evaluation. CBC, CMP WNL. First in-house hsTrop noted at 294.       -------------------------------------------------------------------------------------------  VS:  T(F): 98.4 (03-05), Max: 99.1 (03-05)  HR: 86 (03-05) (83 - 87)  BP: 136/67 (03-05) (130/63 - 155/82)  RR: 17 (03-05)  SpO2: 95% (03-05)  I&O's Summary    PHYSICAL EXAM:  GENERAL: NAD  HEAD: Atraumatic, Normocephalic.  ENT: Moist mucous membranes.  NECK: Supple, No JVD.  CHEST/LUNG: Clear to auscultation bilaterally; No rales, rhonchi, wheezing, or rubs. Unlabored respirations.  HEART: Regular rate and rhythm; No murmurs, rubs, or gallops.  ABDOMEN: Bowel sounds present; Soft, Nontender, Nondistended.   EXTREMITIES: No edema. 2+ Peripheral Pulses, brisk capillary refill. No clubbing or cyanosis.     -------------------------------------------------------------------------------------------  LABS:                          12.6   5.54  )-----------( 203      ( 05 Mar 2025 22:07 )             35.9     03-05    140  |  104  |  9   ----------------------------<  110[H]  3.4[L]   |  23  |  0.61    Ca    8.9      05 Mar 2025 22:07    TPro  6.7  /  Alb  3.8  /  TBili  0.4  /  DBili  x   /  AST  18  /  ALT  16  /  AlkPhos  85  03-05    PT/INR - ( 05 Mar 2025 22:07 )   PT: 11.6 sec;   INR: 1.01 ratio         PTT - ( 05 Mar 2025 22:07 )  PTT:27.4 sec  CARDIAC MARKERS ( 05 Mar 2025 22:07 )  294 ng/L / x     / x     / x     / x     / 4.3 ng/mL            -------------------------------------------------------------------------------------------  Meds:  dextrose 5%. 1000 milliLiter(s) IV Continuous <Continuous>  dextrose 5%. 1000 milliLiter(s) IV Continuous <Continuous>  dextrose 50% Injectable 25 Gram(s) IV Push once  dextrose 50% Injectable 12.5 Gram(s) IV Push once  dextrose 50% Injectable 25 Gram(s) IV Push once  dextrose Oral Gel 15 Gram(s) Oral once PRN  glucagon  Injectable 1 milliGRAM(s) IntraMuscular once  heparin   Injectable 3800 Unit(s) IV Push every 6 hours PRN  heparin  Infusion.  Unit(s)/Hr IV Continuous <Continuous>  insulin lispro (ADMELOG) corrective regimen sliding scale   SubCutaneous three times a day before meals  insulin lispro (ADMELOG) corrective regimen sliding scale   SubCutaneous at bedtime    -------------------------------------------------------------------------------------------  Cardiovascular Diagnostic Testing:    ECG: NSR, LVH    -------------------------------------------------------------------------------------------  
HPI:   Patient is a 74 y/o Malay speaking F, poor historian. Pt reports she doesn't know how to read or write, but states she can recognize numbers. Pt w/h/o uncontrolled T2DM (A1C 9.5%) on multiple DM meds per EMR( Basaglar/Metformin/repaglinide/Actos/Januvia and Ozemopic). No known DM complications. Also h/o HIV (on HAART), HTN, Asthma, HLD. Pt was transferred from [facility] w/ concern for elevated cardiac enzymes. Patient endorsed 2 week h/o worsening epigastric pain and chest discomfort. States she was having trouble walking upstairs with abd pain, gas pain and chest discomfort. Patient states she went to see her  @1PM for HIV f/u when an argument commenced and the patient began having severe substernal chest pain w/ asssociated SOB. EMS activated, patient was brought to . There, AVSS, ECG NSR. hsTroponin trend by their assay 254 -> 271. Labs otherwise only notable for mild hypoK, Patient was loaded with  at  transferred to [facility] for further cardiac workup. Pt found to have NSTEMI and is now s/p cardiac cath with mid RCA sten placement on 3/6/25.   Endocrine consult requested for uncontrolled T2DM.   Pt reports feeling much better. Denies any CP/SOB/Abd pain or gas pain. Tolerating POs and eating 100% of her meals.  BG levels at goal without any hypoglycemia while on present insulin doses.        DM Hx  -Type of DM: T2DM  -Age of diagnosis: Pt states she can't recall but at least 20 to 30 years ago.   Pt reported she was hospitalized at time of diagnosis. She was having trouble seeing and a friend took her BG and it was very high> she was taken to the hospital where she was told to have diabetes. She was originally on tablets but then the doctor told her she needed to take a shot every night > can't recall for how long she has been on this shot but states for many years. Reports that recently she was also started on another injection once a week in addition to her tablets.    - Pt denies h/o of hypoglycemia. She checks BG fasting and hs with BGs in am in 100s and 200s at hs. Pt has an old glucose meter >"many years"  - Pt reports never hospitalized due to her DM after diagnosis  - Denies any DM complications. States she sees her eye doctor once to twice a year. Doesn't know of any eye/kidney/heart or circulatory complication due to her DM.  - DM meds: Pt states she takes the pills she is prescribed and they are many but doesn't know how many. Appears that some one helps her to keep tablets separate and she takes them throughout the day. Pt reports she has some meds with her but states people keep asking to see them and won't show them any more.   Per EMR, pt on  · 	metFORMIN 1000 mg oral tablet: Last Dose Taken:  , 1 tab(s) orally 2 times a day  · 	repaglinide 1 mg oral tablet: Last Dose Taken:  , 1 tab(s) orally 3 times a day  · 	Actos 30 mg oral tablet: Last Dose Taken:  , 1 tab(s) orally once a day  · 	Basaglar KwikPen 100 units/mL subcutaneous solution: Last Dose Taken:  , 20 unit(s) subcutaneous once a day (at bedtime)  · 	Januvia 100 mg oral tablet: 1 tab(s) orally once a day  · 	Jardiance 25 mg oral tablet: Last Dose Taken:  , 1 tab(s) orally once a day  · 	Ozempic 2 mg/1.5 mL (0.25 mg or 0.5 mg dose) subcutaneous solution: Last Dose Taken:  , 0.5 milligram(s) subcutaneously once a week  Diet: Has food stamps and also goes to [employer] center at her [employer]. Breakfast: cereal/milk/fruit and egg Lunch: Tortilla or rice or yuca with vegetables and chicken/meat, Dinner the same as lunch or less. Tries to avoid juices and regular drinks. Asks friends to read labels for her with no sugar/zero. Likes bananas and plantains. Likes cookies but doesn't eat them all the time    Doesn't have endocrinologist    Review of Systems:  Constitutional: No fever  Eyes: No blurry vision  Neuro: No tremors  HEENT: No pain  Cardiovascular: No chest pain, palpitations  Respiratory: No SOB, no cough  GI: No nausea, vomiting, abdominal pain, gas pain  : No dysuria  Skin: no rash  Psych: no depression  Endocrine: no polyuria, polydipsia, Fatigue, Increased sensitivity to cold, Increased sleepiness, Dry skin, Constipation, Muscle weakness, Weight loss, Rapid heartbeat (tachycardia), Increased sensitivity to heat, Excess sweating, Tremors  Hem/lymph: no swelling  Osteoporosis: no fractures    ALL OTHER SYSTEMS REVIEWED AND NEGATIVE      Allergies    No Known Allergies    Intolerances        PAST MEDICAL & SURGICAL HISTORY:  Hypertension      Type 2 diabetes mellitus      HIV disease      HLD (hyperlipidemia)      Asthma      No significant past surgical history    ? Goiter. Pt reports she had a "ball" in her neck and was told she needed surgery but the "ball" disappeared from her neck and didn't need surgery. This happened many years ago and reports she didn't take meds for it         FAMILY HISTORY:  Diabetes mellitus>  daughters but doesn't have a relationship with them .   Goiter: doesn't know    Social History:   Lives alone. States she had 9 children but only talks to a son who lives in [place].   Denies any ETOH/Smoking/Drug use      Outpatient Medications:   Per EMR  · 	metoprolol tartrate 50 mg oral tablet: Last Dose Taken:  , 1 tab(s) orally 2 times a day  · 	aspirin 81 mg oral tablet, chewable: Last Dose Taken:  , 1 tab(s) chewed once a day  · 	hydroCHLOROthiazide 12.5 mg oral capsule: Last Dose Taken:  , 1 cap(s) orally once a day  · 	PriLOSEC 40 mg oral delayed release capsule: Last Dose Taken:  , 1 cap(s) orally once a day  · 	Norvasc 10 mg oral tablet: Last Dose Taken:  , 1 tab(s) orally once a day  · 	cholecalciferol 1250 mcg (50,000 intl units) oral capsule: Last Dose Taken:  , 1 cap(s) orally once a week  · 	metFORMIN 1000 mg oral tablet: Last Dose Taken:  , 1 tab(s) orally 2 times a day  · 	latanoprost 0.005% preservative-free ophthalmic solution: Last Dose Taken:  , 1 drop(s) to each affected eye once a day (at bedtime)  · 	atorvastatin 10 mg oral tablet: Last Dose Taken:  , 1 tab(s) orally once a day  · 	repaglinide 1 mg oral tablet: Last Dose Taken:  , 1 tab(s) orally 3 times a day  · 	Actos 30 mg oral tablet: Last Dose Taken:  , 1 tab(s) orally once a day  · 	Basaglar KwikPen 100 units/mL subcutaneous solution: Last Dose Taken:  , 20 unit(s) subcutaneous once a day (at bedtime)  · 	Januvia 100 mg oral tablet: 1 tab(s) orally once a day  · 	Jardiance 25 mg oral tablet: Last Dose Taken:  , 1 tab(s) orally once a day  · 	Ozempic 2 mg/1.5 mL (0.25 mg or 0.5 mg dose) subcutaneous solution: Last Dose Taken:  , 0.5 milligram(s) subcutaneously once a week  · 	Biktarvy 50 mg-200 mg-25 mg oral tablet: Last Dose Taken:  , 1 tab(s) orally once a day      INPATIENT MEDICATIONS  (STANDING):  amLODIPine   Tablet 10 milliGRAM(s) Oral daily  aspirin  chewable 81 milliGRAM(s) Oral daily  atorvastatin 40 milliGRAM(s) Oral at bedtime  bictegravir 50 mG/emtricitabine 200 mG/tenofovir alafenamide 25 mG (BIKTARVY) 1 Tablet(s) Oral daily  clopidogrel Tablet 75 milliGRAM(s) Oral daily  dextrose 5%. 1000 milliLiter(s) (100 mL/Hr) IV Continuous <Continuous>  dextrose 5%. 1000 milliLiter(s) (50 mL/Hr) IV Continuous <Continuous>  dextrose 50% Injectable 25 Gram(s) IV Push once  dextrose 50% Injectable 12.5 Gram(s) IV Push once  dextrose 50% Injectable 25 Gram(s) IV Push once  glucagon  Injectable 1 milliGRAM(s) IntraMuscular once  influenza  Vaccine (HIGH DOSE) 0.5 milliLiter(s) IntraMuscular once  insulin glargine Injectable (LANTUS) 15 Unit(s) SubCutaneous at bedtime  insulin lispro (ADMELOG) corrective regimen sliding scale   SubCutaneous three times a day before meals  insulin lispro (ADMELOG) corrective regimen sliding scale   SubCutaneous at bedtime  latanoprost 0.005% Ophthalmic Solution 1 Drop(s) Both EYES at bedtime  metoprolol tartrate 50 milliGRAM(s) Oral two times a day  pantoprazole    Tablet 40 milliGRAM(s) Oral before breakfast  sodium chloride 0.9%. 1000 milliLiter(s) (100 mL/Hr) IV Continuous <Continuous>    MEDICATIONS  (PRN):  acetaminophen     Tablet .. 650 milliGRAM(s) Oral every 6 hours PRN Temp greater or equal to 38C (100.4F), Mild Pain (1 - 3)  aluminum hydroxide/magnesium hydroxide/simethicone Suspension 30 milliLiter(s) Oral every 4 hours PRN Dyspepsia  dextrose Oral Gel 15 Gram(s) Oral once PRN Blood Glucose LESS THAN 70 milliGRAM(s)/deciliter  melatonin 3 milliGRAM(s) Oral at bedtime PRN Insomnia      PHYSICAL EXAM:  VITALS: T(C): 36.3 (03-07-25 @ 08:11)  T(F): 97.3 (03-07-25 @ 08:11), Max: 98.4 (03-06-25 @ 18:35)  HR: 79 (03-07-25 @ 08:11) (79 - 105)  BP: 134/79 (03-07-25 @ 08:11) (133/81 - 160/90)  RR:  (15 - 20)  SpO2:  (95% - 99%)  Wt(kg): --  GENERAL: NAD, well-groomed, well-developed  EYES: No proptosis, no lid lag, anicteric  HEENT:  Atraumatic, Normocephalic, moist mucous membranes,  THYROID: enlarge size, no palpable nodules  RESPIRATORY: Clear to auscultation bilaterally; No rales, rhonchi, wheezing  CARDIOVASCULAR: Regular rate and rhythm; No murmurs; no peripheral edema  GI: Soft, nontender, non distended, normal bowel sounds, central adiposity  SKIN: Dry, intact, No rashes or lesions  MUSCULOSKELETAL: Full range of motion, normal strength  NEURO: Alert and oriented x 3,sensation intact, extraocular movements intact, no tremor. Poor historian  PSYCH:  normal affect, normal mood.   CUSHING'S SIGNS: no striae      CAPILLARY BLOOD GLUCOSE    POCT Blood Glucose.: 158 mg/dL (03-07-25 @ 09:26)  POCT Blood Glucose.: 172 mg/dL (03-06-25 @ 22:27)  POCT Blood Glucose.: 122 mg/dL (03-06-25 @ 16:05)  POCT Blood Glucose.: 173 mg/dL (03-06-25 @ 08:54)  POCT Blood Glucose.: 110 mg/dL (03-05-25 @ 22:11)                              12.4   5.90  )-----------( 195      ( 07 Mar 2025 06:53 )             36.2       03-07    138  |  103  |  10  ----------------------------<  161[H]  3.8   |  20[L]  |  0.64    eGFR: 93    Ca    8.9      03-07    TPro  6.7  /  Alb  3.8  /  TBili  0.4  /  DBili  x   /  AST  18  /  ALT  16  /  AlkPhos  85  03-05    A1C with Estimated Average Glucose Result: 9.4 % (03-07-25 @ 06:53)  A1C with Estimated Average Glucose Result: 9.5 % (03-06-25 @ 07:04)    03-06 Chol 189 Direct LDL -- LDL calculated 108[H] HDL 48[L] Trig 190[H]        CXR  FINDINGS:  The heart is normal in size.  Nonspecific superior mediastinal opacity.  There is no pneumothorax or pleural effusion.  No acute bony abnormality.  IMPRESSION:  Nonspecific superior mediastinal opacity may represent enlarged thyroid.    EKG:  sinus rhythm. T wave inversions consider inferior ischemia    Echo:    Telemetry  sinus rhythm, intermittent sinus tachycardia

## 2025-03-10 PROBLEM — E78.5 HYPERLIPIDEMIA, UNSPECIFIED: Chronic | Status: ACTIVE | Noted: 2025-03-06

## 2025-03-10 PROBLEM — I10 ESSENTIAL (PRIMARY) HYPERTENSION: Chronic | Status: ACTIVE | Noted: 2025-03-06

## 2025-03-10 PROBLEM — J45.909 UNSPECIFIED ASTHMA, UNCOMPLICATED: Chronic | Status: ACTIVE | Noted: 2025-03-06

## 2025-03-10 PROBLEM — E11.9 TYPE 2 DIABETES MELLITUS WITHOUT COMPLICATIONS: Chronic | Status: ACTIVE | Noted: 2025-03-06

## 2025-03-10 PROBLEM — B20 HUMAN IMMUNODEFICIENCY VIRUS [HIV] DISEASE: Chronic | Status: ACTIVE | Noted: 2025-03-06

## 2025-03-12 PROBLEM — Z00.00 ENCOUNTER FOR PREVENTIVE HEALTH EXAMINATION: Status: ACTIVE | Noted: 2025-03-12

## 2025-03-13 ENCOUNTER — OUTPATIENT (OUTPATIENT)
Dept: OUTPATIENT SERVICES | Facility: HOSPITAL | Age: 75
LOS: 1 days | End: 2025-03-13
Payer: MEDICAID

## 2025-03-13 ENCOUNTER — APPOINTMENT (OUTPATIENT)
Dept: INTERNAL MEDICINE | Facility: CLINIC | Age: 75
End: 2025-03-13

## 2025-03-13 VITALS — HEART RATE: 77 BPM | DIASTOLIC BLOOD PRESSURE: 70 MMHG | SYSTOLIC BLOOD PRESSURE: 120 MMHG | OXYGEN SATURATION: 97 %

## 2025-03-13 DIAGNOSIS — J98.59 OTHER DISEASES OF MEDIASTINUM, NOT ELSEWHERE CLASSIFIED: ICD-10-CM

## 2025-03-13 DIAGNOSIS — I25.10 ATHEROSCLEROTIC HEART DISEASE OF NATIVE CORONARY ARTERY WITHOUT ANGINA PECTORIS: ICD-10-CM

## 2025-03-13 DIAGNOSIS — Z60.2 PROBLEMS RELATED TO LIVING ALONE: ICD-10-CM

## 2025-03-13 DIAGNOSIS — E11.9 TYPE 2 DIABETES MELLITUS WITHOUT COMPLICATIONS: ICD-10-CM

## 2025-03-13 DIAGNOSIS — I25.10 ATHEROSCLEROTIC HEART DISEASE OF NATIVE CORONARY ARTERY W/OUT ANGINA PECTORIS: ICD-10-CM

## 2025-03-13 DIAGNOSIS — E11.9 TYPE 2 DIABETES MELLITUS W/OUT COMPLICATIONS: ICD-10-CM

## 2025-03-13 DIAGNOSIS — I10 ESSENTIAL (PRIMARY) HYPERTENSION: ICD-10-CM

## 2025-03-13 PROCEDURE — G0463: CPT

## 2025-03-13 PROCEDURE — 99203 OFFICE O/P NEW LOW 30 MIN: CPT | Mod: GE

## 2025-03-13 RX ORDER — SITAGLIPTIN 100 MG/1
100 TABLET, FILM COATED ORAL
Qty: 90 | Refills: 2 | Status: ACTIVE | COMMUNITY
Start: 2025-03-13

## 2025-03-13 RX ORDER — METFORMIN HYDROCHLORIDE 1000 MG/1
1000 TABLET, COATED ORAL
Qty: 180 | Refills: 0 | Status: ACTIVE | COMMUNITY
Start: 2025-03-13

## 2025-03-13 RX ORDER — CLOPIDOGREL BISULFATE 75 MG/1
75 TABLET, FILM COATED ORAL DAILY
Qty: 90 | Refills: 3 | Status: ACTIVE | COMMUNITY
Start: 2025-03-13 | End: 1900-01-01

## 2025-03-13 RX ORDER — EMPAGLIFLOZIN 25 MG/1
25 TABLET, FILM COATED ORAL DAILY
Qty: 90 | Refills: 1 | Status: ACTIVE | COMMUNITY
Start: 2025-03-13

## 2025-03-13 RX ORDER — BICTEGRAVIR SODIUM, EMTRICITABINE, AND TENOFOVIR ALAFENAMIDE FUMARATE 50; 200; 25 MG/1; MG/1; MG/1
50-200-25 TABLET ORAL DAILY
Refills: 0 | Status: ACTIVE | COMMUNITY
Start: 2025-03-13

## 2025-03-13 RX ORDER — ATORVASTATIN CALCIUM 10 MG/1
10 TABLET, FILM COATED ORAL
Qty: 90 | Refills: 1 | Status: ACTIVE | COMMUNITY
Start: 2025-03-13 | End: 1900-01-01

## 2025-03-13 RX ORDER — SEMAGLUTIDE 2.68 MG/ML
8 INJECTION, SOLUTION SUBCUTANEOUS
Refills: 0 | Status: ACTIVE | COMMUNITY
Start: 2025-03-13

## 2025-03-13 RX ORDER — INSULIN GLARGINE 100 [IU]/ML
100 INJECTION, SOLUTION SUBCUTANEOUS AT BEDTIME
Refills: 0 | Status: ACTIVE | COMMUNITY
Start: 2025-03-13

## 2025-03-13 RX ORDER — METOPROLOL TARTRATE 50 MG/1
50 TABLET ORAL DAILY
Refills: 0 | Status: ACTIVE | COMMUNITY
Start: 2025-03-13

## 2025-03-13 SDOH — SOCIAL STABILITY - SOCIAL INSECURITY: PROBLEMS RELATED TO LIVING ALONE: Z60.2

## 2025-03-25 ENCOUNTER — NON-APPOINTMENT (OUTPATIENT)
Age: 75
End: 2025-03-25

## 2025-03-25 ENCOUNTER — APPOINTMENT (OUTPATIENT)
Dept: CARDIOLOGY | Facility: CLINIC | Age: 75
End: 2025-03-25
Payer: MEDICARE

## 2025-03-25 VITALS
HEIGHT: 58 IN | SYSTOLIC BLOOD PRESSURE: 130 MMHG | OXYGEN SATURATION: 96 % | WEIGHT: 136 LBS | BODY MASS INDEX: 28.55 KG/M2 | HEART RATE: 56 BPM | DIASTOLIC BLOOD PRESSURE: 80 MMHG

## 2025-03-25 PROCEDURE — 93000 ELECTROCARDIOGRAM COMPLETE: CPT

## 2025-03-25 PROCEDURE — 99214 OFFICE O/P EST MOD 30 MIN: CPT | Mod: 25

## 2025-03-26 RX ORDER — HYDROCHLOROTHIAZIDE 12.5 MG/1
12.5 CAPSULE ORAL
Refills: 0 | Status: ACTIVE | COMMUNITY
Start: 2025-03-26

## 2025-03-26 RX ORDER — AMLODIPINE BESYLATE 10 MG/1
10 TABLET ORAL
Refills: 0 | Status: ACTIVE | COMMUNITY
Start: 2025-03-26

## 2025-03-26 RX ORDER — OMEPRAZOLE 40 MG/1
40 CAPSULE, DELAYED RELEASE ORAL
Refills: 0 | Status: ACTIVE | COMMUNITY
Start: 2025-03-26

## 2025-04-11 ENCOUNTER — OUTPATIENT (OUTPATIENT)
Dept: OUTPATIENT SERVICES | Facility: HOSPITAL | Age: 75
LOS: 1 days | End: 2025-04-11
Payer: COMMERCIAL

## 2025-04-11 ENCOUNTER — APPOINTMENT (OUTPATIENT)
Dept: ULTRASOUND IMAGING | Facility: IMAGING CENTER | Age: 75
End: 2025-04-11

## 2025-04-11 DIAGNOSIS — J98.59 OTHER DISEASES OF MEDIASTINUM, NOT ELSEWHERE CLASSIFIED: ICD-10-CM

## 2025-04-11 PROCEDURE — 76536 US EXAM OF HEAD AND NECK: CPT | Mod: 26

## 2025-04-11 PROCEDURE — 76536 US EXAM OF HEAD AND NECK: CPT

## 2025-05-09 ENCOUNTER — APPOINTMENT (OUTPATIENT)
Dept: CARDIOLOGY | Facility: CLINIC | Age: 75
End: 2025-05-09

## 2025-05-13 ENCOUNTER — NON-APPOINTMENT (OUTPATIENT)
Age: 75
End: 2025-05-13

## 2025-05-13 ENCOUNTER — APPOINTMENT (OUTPATIENT)
Dept: CARDIOLOGY | Facility: CLINIC | Age: 75
End: 2025-05-13
Payer: MEDICARE

## 2025-05-13 VITALS
HEART RATE: 85 BPM | RESPIRATION RATE: 16 BRPM | WEIGHT: 136 LBS | DIASTOLIC BLOOD PRESSURE: 74 MMHG | HEIGHT: 58 IN | BODY MASS INDEX: 28.55 KG/M2 | SYSTOLIC BLOOD PRESSURE: 115 MMHG | OXYGEN SATURATION: 99 %

## 2025-05-13 PROCEDURE — 93000 ELECTROCARDIOGRAM COMPLETE: CPT

## 2025-05-13 PROCEDURE — 99214 OFFICE O/P EST MOD 30 MIN: CPT | Mod: 25

## 2025-05-20 ENCOUNTER — NON-APPOINTMENT (OUTPATIENT)
Age: 75
End: 2025-05-20

## 2025-06-17 ENCOUNTER — APPOINTMENT (OUTPATIENT)
Dept: CARDIOLOGY | Facility: CLINIC | Age: 75
End: 2025-06-17

## 2025-07-08 ENCOUNTER — APPOINTMENT (OUTPATIENT)
Dept: CARDIOLOGY | Facility: CLINIC | Age: 75
End: 2025-07-08

## 2025-07-16 ENCOUNTER — APPOINTMENT (OUTPATIENT)
Dept: ENDOCRINOLOGY | Facility: CLINIC | Age: 75
End: 2025-07-16
Payer: MEDICARE

## 2025-07-16 VITALS
HEART RATE: 81 BPM | BODY MASS INDEX: 28.22 KG/M2 | RESPIRATION RATE: 16 BRPM | DIASTOLIC BLOOD PRESSURE: 87 MMHG | SYSTOLIC BLOOD PRESSURE: 137 MMHG | TEMPERATURE: 97.16 F | OXYGEN SATURATION: 98 % | WEIGHT: 135 LBS

## 2025-07-16 PROBLEM — I10 HTN (HYPERTENSION): Status: ACTIVE | Noted: 2025-07-16

## 2025-07-16 PROBLEM — E78.5 HLD (HYPERLIPIDEMIA): Status: ACTIVE | Noted: 2025-07-16

## 2025-07-16 LAB
GLUCOSE BLDC GLUCOMTR-MCNC: 193
HBA1C MFR BLD HPLC: 8

## 2025-07-16 PROCEDURE — 83036 HEMOGLOBIN GLYCOSYLATED A1C: CPT | Mod: QW

## 2025-07-16 PROCEDURE — 99204 OFFICE O/P NEW MOD 45 MIN: CPT | Mod: 25

## 2025-07-16 PROCEDURE — 82962 GLUCOSE BLOOD TEST: CPT

## 2025-07-22 ENCOUNTER — APPOINTMENT (OUTPATIENT)
Dept: CARDIOLOGY | Facility: CLINIC | Age: 75
End: 2025-07-22
Payer: MEDICARE

## 2025-07-22 VITALS
HEART RATE: 76 BPM | SYSTOLIC BLOOD PRESSURE: 131 MMHG | BODY MASS INDEX: 28.97 KG/M2 | OXYGEN SATURATION: 99 % | DIASTOLIC BLOOD PRESSURE: 86 MMHG | RESPIRATION RATE: 18 BRPM | WEIGHT: 138 LBS | HEIGHT: 58 IN

## 2025-07-22 DIAGNOSIS — L97.519 NON-PRESSURE CHRONIC ULCER OF OTHER PART OF RIGHT FOOT WITH UNSPECIFIED SEVERITY: ICD-10-CM

## 2025-07-22 DIAGNOSIS — I25.10 ATHEROSCLEROTIC HEART DISEASE OF NATIVE CORONARY ARTERY W/OUT ANGINA PECTORIS: ICD-10-CM

## 2025-07-22 PROCEDURE — 93000 ELECTROCARDIOGRAM COMPLETE: CPT

## 2025-07-22 PROCEDURE — 99214 OFFICE O/P EST MOD 30 MIN: CPT | Mod: 25

## 2025-07-22 RX ORDER — METOPROLOL SUCCINATE 50 MG/1
50 TABLET, EXTENDED RELEASE ORAL
Qty: 90 | Refills: 1 | Status: ACTIVE | COMMUNITY
Start: 2025-07-22 | End: 1900-01-01

## 2025-07-22 RX ORDER — ATORVASTATIN CALCIUM 20 MG/1
20 TABLET, FILM COATED ORAL
Qty: 90 | Refills: 3 | Status: ACTIVE | COMMUNITY
Start: 2025-07-22 | End: 1900-01-01

## 2025-08-21 ENCOUNTER — APPOINTMENT (OUTPATIENT)
Dept: PODIATRY | Facility: CLINIC | Age: 75
End: 2025-08-21

## 2025-08-27 ENCOUNTER — APPOINTMENT (OUTPATIENT)
Dept: VASCULAR SURGERY | Facility: CLINIC | Age: 75
End: 2025-08-27

## 2025-09-04 ENCOUNTER — APPOINTMENT (OUTPATIENT)
Dept: PODIATRY | Facility: CLINIC | Age: 75
End: 2025-09-04

## 2025-09-10 ENCOUNTER — NON-APPOINTMENT (OUTPATIENT)
Age: 75
End: 2025-09-10

## 2025-09-10 ENCOUNTER — APPOINTMENT (OUTPATIENT)
Dept: PODIATRY | Facility: CLINIC | Age: 75
End: 2025-09-10
Payer: MEDICARE

## 2025-09-10 ENCOUNTER — APPOINTMENT (OUTPATIENT)
Dept: RADIOLOGY | Facility: CLINIC | Age: 75
End: 2025-09-10
Payer: MEDICARE

## 2025-09-10 VITALS
BODY MASS INDEX: 28.13 KG/M2 | RESPIRATION RATE: 18 BRPM | OXYGEN SATURATION: 98 % | TEMPERATURE: 97.5 F | HEIGHT: 58 IN | HEART RATE: 82 BPM | WEIGHT: 134 LBS | SYSTOLIC BLOOD PRESSURE: 150 MMHG | DIASTOLIC BLOOD PRESSURE: 75 MMHG

## 2025-09-10 DIAGNOSIS — M25.579 PAIN IN UNSPECIFIED ANKLE AND JOINTS OF UNSPECIFIED FOOT: ICD-10-CM

## 2025-09-10 DIAGNOSIS — M79.671 PAIN IN RIGHT FOOT: ICD-10-CM

## 2025-09-10 PROCEDURE — 73610 X-RAY EXAM OF ANKLE: CPT | Mod: RT

## 2025-09-10 PROCEDURE — 73630 X-RAY EXAM OF FOOT: CPT | Mod: RT

## 2025-09-10 PROCEDURE — 99204 OFFICE O/P NEW MOD 45 MIN: CPT

## 2025-09-16 ENCOUNTER — APPOINTMENT (OUTPATIENT)
Dept: CARDIOLOGY | Facility: CLINIC | Age: 75
End: 2025-09-16